# Patient Record
Sex: MALE | Race: WHITE | Employment: OTHER | URBAN - NONMETROPOLITAN AREA
[De-identification: names, ages, dates, MRNs, and addresses within clinical notes are randomized per-mention and may not be internally consistent; named-entity substitution may affect disease eponyms.]

---

## 2017-05-25 LAB
CHOLESTEROL, TOTAL: 207 MG/DL
CHOLESTEROL/HDL RATIO: NORMAL
HDLC SERPL-MCNC: 45 MG/DL (ref 35–70)
LDL CHOLESTEROL CALCULATED: 137 MG/DL (ref 0–160)
TRIGL SERPL-MCNC: 168 MG/DL
VLDLC SERPL CALC-MCNC: NORMAL MG/DL

## 2019-05-29 ENCOUNTER — OFFICE VISIT (OUTPATIENT)
Dept: FAMILY MEDICINE CLINIC | Age: 74
End: 2019-05-29
Payer: COMMERCIAL

## 2019-05-29 VITALS
HEIGHT: 71 IN | TEMPERATURE: 97.3 F | OXYGEN SATURATION: 95 % | WEIGHT: 200 LBS | SYSTOLIC BLOOD PRESSURE: 138 MMHG | HEART RATE: 82 BPM | DIASTOLIC BLOOD PRESSURE: 78 MMHG | BODY MASS INDEX: 28 KG/M2

## 2019-05-29 DIAGNOSIS — R07.89 OTHER CHEST PAIN: Primary | ICD-10-CM

## 2019-05-29 PROCEDURE — 93000 ELECTROCARDIOGRAM COMPLETE: CPT | Performed by: NURSE PRACTITIONER

## 2019-05-29 PROCEDURE — 99213 OFFICE O/P EST LOW 20 MIN: CPT | Performed by: NURSE PRACTITIONER

## 2019-05-29 RX ORDER — ALBUTEROL SULFATE 90 UG/1
AEROSOL, METERED RESPIRATORY (INHALATION)
Refills: 11 | COMMUNITY
Start: 2019-05-04 | End: 2019-10-07 | Stop reason: SDUPTHER

## 2019-05-29 RX ORDER — TERBUTALINE SULFATE 2.5 MG/1
TABLET ORAL
Refills: 5 | COMMUNITY
Start: 2019-05-04 | End: 2020-06-25

## 2019-05-29 RX ORDER — LISINOPRIL 40 MG/1
TABLET ORAL
Refills: 1 | COMMUNITY
Start: 2019-05-04 | End: 2019-07-03

## 2019-05-29 NOTE — PROGRESS NOTES
Subjective:  Chief Complaint   Patient presents with    Pain     chest to back statrted last Thursday thought he pulled something       HPI:  The patient complains of intermittent or constant chest pain located in the anterior chest radiation to the back which started 6 days ago. The chest pain began at rest and is not worse with exertion. Denies pain with inspiration. Pain is descried as 10.5 out of 10. No aggravating or relieving factors. No nausea or diaphoresis. The patient denies dyspnea or palpitations. Does have hx asthma but breathing is stable per his report. No previous episodes of chest discomfort quite like this. No recent illness. No trauma or injury to the chest.  The patient denies malaise, fatigue, nasal congestion, cough and sore throat. No fever or chills. No vomiting or diarrhea. Bowel movements are normal, no black or bloody stools. The patient is a non-smoker. No leg pain or swelling. No recent travel, trauma and surgery or OC use or hormone replacement therapy. The patient has not had Aspirin prior to arrival.  The patient presents for evaluation. No asuncion cardiac history. Current Outpatient Medications:     albuterol sulfate  (90 Base) MCG/ACT inhaler, INHALE 2 PUFFS BY MOUTH EVERY 4 TO 6 HOURS AS NEEDED, Disp: , Rfl: 11    lisinopril (PRINIVIL;ZESTRIL) 40 MG tablet, TAKE ONE TABLET BY MOUTH EVERY DAY, Disp: , Rfl: 1    terbutaline (BRETHINE) 2.5 MG tablet, TAKE ONE TABLET BY MOUTH TWO TIMES A DAY, Disp: , Rfl: 5   Allergies   Allergen Reactions    Hydrochlorothiazide Hives    Amlodipine     Bee Venom     Erythromycin Diarrhea    Penicillins       No past medical history on file.      Objective:  Vitals:    05/29/19 1343   BP: 138/78   Site: Left Upper Arm   Position: Sitting   Cuff Size: Medium Adult   Pulse: 82   Temp: 97.3 °F (36.3 °C)   TempSrc: Temporal   SpO2: 95%   Weight: 200 lb (90.7 kg)   Height: 5' 11\" (1.803 m)        Exam:  Const: Appears healthy and well developed. No signs of acute distress present. Vitals reviewed per triage. Head/Face: Normocephalic, atraumatic. Facies is symmetric. Eyes: PERRL. ENMT: Tympanic membranes are pearly gray with good light reflex bilaterally. Nares are patent. Buccal mucosa is moist.  No erythema in the posterior pharynx. Neck: Supple and symmetric. Palpation reveals no adenopathy. Trachea midline. No JVD. Resp: Lungs are clear bilaterally. No wheezes, rhonchi or crackles noted. Chest expansion is symmetrical no accessory muscle use is noted. CV: S1 is normal. S2 is normal.  No murmurs rubs or clicks noted. No chest wall tenderness. Extremities: Peripheral circulation is grossly normal.  Abdomen: Soft, nontender to palpation. No masses or organomegaly. No pulsatile mass. No rebound or guarding. Musculo: Patient moves extremities without pain or limitation. No pedal edema. Skin: Skin is warm and dry. Neuro: Alert and oriented x3. Speech is articulate and fluent. Psych: Mood/Affect: Patient's mood and affect is appropriate to situation. [facility] was seen today for pain. Diagnoses and all orders for this visit:    Other chest pain  -     EKG 12 Lead      His EKG is unchanged from previous, however, given his history and physical, I do believe further evaluation is warranted in the ER. Report is called. He is hemodynamically stable and refuses EMS, will drive himself.   Seen By:    Alfredia Schaumann, CORY - CNP

## 2019-06-06 LAB
ALBUMIN SERPL-MCNC: NORMAL G/DL
ALP BLD-CCNC: NORMAL U/L
ALT SERPL-CCNC: NORMAL U/L
ANION GAP SERPL CALCULATED.3IONS-SCNC: NORMAL MMOL/L
AST SERPL-CCNC: NORMAL U/L
BILIRUB SERPL-MCNC: NORMAL MG/DL (ref 0.1–1.4)
BUN BLDV-MCNC: NORMAL MG/DL
CALCIUM SERPL-MCNC: NORMAL MG/DL
CHLORIDE BLD-SCNC: NORMAL MMOL/L
CO2: NORMAL MMOL/L
CREAT SERPL-MCNC: NORMAL MG/DL
GFR CALCULATED: NORMAL
GLUCOSE BLD-MCNC: NORMAL MG/DL
HCT VFR BLD CALC: NORMAL % (ref 41–53)
HEMOGLOBIN: NORMAL G/DL (ref 13.5–17.5)
PLATELET # BLD: NORMAL K/ΜL
POTASSIUM SERPL-SCNC: NORMAL MMOL/L
SODIUM BLD-SCNC: NORMAL MMOL/L
TOTAL PROTEIN: NORMAL
WBC # BLD: NORMAL 10^3/ML

## 2019-06-25 LAB
BUN BLDV-MCNC: NORMAL MG/DL
CALCIUM SERPL-MCNC: NORMAL MG/DL
CHLORIDE BLD-SCNC: NORMAL MMOL/L
CO2: NORMAL MMOL/L
CREAT SERPL-MCNC: NORMAL MG/DL
GFR CALCULATED: NORMAL
GLUCOSE BLD-MCNC: NORMAL MG/DL
POTASSIUM SERPL-SCNC: NORMAL MMOL/L
SODIUM BLD-SCNC: NORMAL MMOL/L

## 2019-07-03 ENCOUNTER — OFFICE VISIT (OUTPATIENT)
Dept: FAMILY MEDICINE CLINIC | Age: 74
End: 2019-07-03
Payer: COMMERCIAL

## 2019-07-03 VITALS
WEIGHT: 196 LBS | DIASTOLIC BLOOD PRESSURE: 68 MMHG | BODY MASS INDEX: 27.44 KG/M2 | TEMPERATURE: 97.6 F | OXYGEN SATURATION: 98 % | SYSTOLIC BLOOD PRESSURE: 138 MMHG | HEIGHT: 71 IN | HEART RATE: 72 BPM

## 2019-07-03 DIAGNOSIS — I25.10 ARTERIOSCLEROTIC CORONARY ARTERY DISEASE: ICD-10-CM

## 2019-07-03 DIAGNOSIS — J45.998 OTHER ASTHMA: ICD-10-CM

## 2019-07-03 DIAGNOSIS — I10 ESSENTIAL HYPERTENSION: ICD-10-CM

## 2019-07-03 DIAGNOSIS — E78.49 OTHER HYPERLIPIDEMIA: ICD-10-CM

## 2019-07-03 PROBLEM — E78.5 HYPERLIPIDEMIA: Status: ACTIVE | Noted: 2019-07-03

## 2019-07-03 PROCEDURE — 99214 OFFICE O/P EST MOD 30 MIN: CPT | Performed by: FAMILY MEDICINE

## 2019-07-03 RX ORDER — POTASSIUM CHLORIDE 20 MEQ/1
TABLET, EXTENDED RELEASE ORAL
Refills: 0 | COMMUNITY
Start: 2019-07-01 | End: 2020-06-25

## 2019-07-03 RX ORDER — IPRATROPIUM BROMIDE 17 UG/1
AEROSOL, METERED RESPIRATORY (INHALATION)
Refills: 2 | COMMUNITY
Start: 2019-06-24 | End: 2019-10-07

## 2019-07-03 RX ORDER — METOPROLOL TARTRATE 50 MG/1
TABLET, FILM COATED ORAL
Refills: 1 | COMMUNITY
Start: 2019-06-24 | End: 2019-10-07 | Stop reason: SDUPTHER

## 2019-07-03 RX ORDER — MULTIVIT,CALC,MINS/IRON/FOLIC 9MG-400MCG
TABLET ORAL
Refills: 1 | COMMUNITY
Start: 2019-06-24 | End: 2020-06-25

## 2019-07-03 RX ORDER — ATORVASTATIN CALCIUM 40 MG/1
TABLET, FILM COATED ORAL
Refills: 1 | COMMUNITY
Start: 2019-06-24 | End: 2019-10-07 | Stop reason: SDUPTHER

## 2019-07-03 RX ORDER — FUROSEMIDE 40 MG/1
TABLET ORAL
Refills: 0 | COMMUNITY
Start: 2019-07-01 | End: 2020-06-25

## 2019-07-03 RX ORDER — AMIODARONE HYDROCHLORIDE 200 MG/1
TABLET ORAL
Refills: 0 | COMMUNITY
Start: 2019-06-24 | End: 2019-08-23

## 2019-07-03 RX ORDER — TRAMADOL HYDROCHLORIDE 50 MG/1
50 TABLET ORAL EVERY 6 HOURS PRN
Qty: 40 TABLET | Refills: 1 | Status: SHIPPED | OUTPATIENT
Start: 2019-07-03 | End: 2019-07-17

## 2019-07-03 ASSESSMENT — PATIENT HEALTH QUESTIONNAIRE - PHQ9
SUM OF ALL RESPONSES TO PHQ QUESTIONS 1-9: 0
2. FEELING DOWN, DEPRESSED OR HOPELESS: 0
SUM OF ALL RESPONSES TO PHQ QUESTIONS 1-9: 0
SUM OF ALL RESPONSES TO PHQ9 QUESTIONS 1 & 2: 0
1. LITTLE INTEREST OR PLEASURE IN DOING THINGS: 0

## 2019-07-03 NOTE — PROGRESS NOTES
OFFICE NOTE    7/3/19  Name: Jeremy Goldman  :1945   Sex:male   Age:73 y.o. SUBJECTIVE  Chief Complaint   Patient presents with    Follow-Up from Hospital     bypass [facility]       HPI  Comes in for transition to care. Developed unstable angina. Was transported to [facility], waited around for several days, signed out [place] and decided to go to [facility] where had bypass and AVR        Review of Systems   Constitutional: Negative for appetite change, fever and unexpected weight change. Nausea and anorexia better. Taking Tylenol for pain   HENT: Negative for congestion, ear pain, hearing loss, sinus pain, sore throat and trouble swallowing. Eyes: Negative for photophobia, redness and visual disturbance. Respiratory: Negative for cough, shortness of breath and wheezing. Chest wall pain   Cardiovascular: Negative for chest pain, palpitations and leg swelling. Gastrointestinal: Negative for abdominal pain, blood in stool, constipation, diarrhea and vomiting. Endocrine: Negative for cold intolerance, polydipsia and polyuria. Genitourinary: Positive for difficulty urinating. Negative for genital sores, hematuria and urgency. Seems to be improving. Had catheter for a while   Musculoskeletal: Positive for arthralgias. Negative for back pain and joint swelling. Skin: Positive for wound. Negative for rash. Venous incision  For graft harvesting bruised, no infection seen, remains painful   Allergic/Immunologic: Negative for food allergies. Neurological: Negative for dizziness, tremors, syncope and headaches. Hematological: Negative for adenopathy. Does not bruise/bleed easily. Psychiatric/Behavioral: Negative for agitation, dysphoric mood, hallucinations and sleep disturbance.             Current Outpatient Medications:     amiodarone (CORDARONE) 200 MG tablet, TAKE ONE TABLET BY MOUTH TWO TIMES A DAY FOR 28 DAYS, Disp: , Rfl: 0    atorvastatin (LIPITOR) 40 MG tablet, TAKE 1

## 2019-07-06 ASSESSMENT — ENCOUNTER SYMPTOMS
SORE THROAT: 0
DIARRHEA: 0
ABDOMINAL PAIN: 0
TROUBLE SWALLOWING: 0
PHOTOPHOBIA: 0
COUGH: 0
BLOOD IN STOOL: 0
EYE REDNESS: 0
SHORTNESS OF BREATH: 0
WHEEZING: 0
BACK PAIN: 0
SINUS PAIN: 0
VOMITING: 0
CONSTIPATION: 0

## 2019-08-05 ENCOUNTER — TELEPHONE (OUTPATIENT)
Dept: FAMILY MEDICINE CLINIC | Age: 74
End: 2019-08-05

## 2019-08-05 RX ORDER — FLUCONAZOLE 150 MG/1
150 TABLET ORAL
Qty: 2 TABLET | Refills: 0 | Status: SHIPPED | OUTPATIENT
Start: 2019-08-05 | End: 2019-08-09

## 2019-08-21 ENCOUNTER — TELEPHONE (OUTPATIENT)
Dept: FAMILY MEDICINE CLINIC | Age: 74
End: 2019-08-21

## 2019-08-22 NOTE — TELEPHONE ENCOUNTER
Patient verbalized understanding to doctors response/instructions, stated that after being released from the hospital his taste for food is like eating road kill, states he hates yogurt and isn't going to eat that, but did confirm he would be in during doctors express hours tomorrow. Express hours given to patient as stated on [facility] Express Schedule, E 3-5p on Fri 8/23/19.

## 2019-08-22 NOTE — TELEPHONE ENCOUNTER
Have him come in Friday and I'll take a look at it during Express.  Drink some Kfir (liquid yogurt or get some yogurt with live cultures and start this

## 2019-08-23 ENCOUNTER — OFFICE VISIT (OUTPATIENT)
Dept: FAMILY MEDICINE CLINIC | Age: 74
End: 2019-08-23
Payer: COMMERCIAL

## 2019-08-23 VITALS
HEIGHT: 71 IN | WEIGHT: 206 LBS | OXYGEN SATURATION: 99 % | BODY MASS INDEX: 28.84 KG/M2 | DIASTOLIC BLOOD PRESSURE: 70 MMHG | SYSTOLIC BLOOD PRESSURE: 124 MMHG | TEMPERATURE: 97.9 F | HEART RATE: 70 BPM

## 2019-08-23 DIAGNOSIS — I25.10 ARTERIOSCLEROTIC CORONARY ARTERY DISEASE: ICD-10-CM

## 2019-08-23 DIAGNOSIS — K14.3 TONGUE COATING: Primary | ICD-10-CM

## 2019-08-23 PROCEDURE — 99213 OFFICE O/P EST LOW 20 MIN: CPT | Performed by: FAMILY MEDICINE

## 2019-08-23 RX ORDER — CANDESARTAN 4 MG/1
1 TABLET ORAL DAILY
Refills: 3 | COMMUNITY
Start: 2019-08-02 | End: 2019-10-07 | Stop reason: SDUPTHER

## 2019-08-23 NOTE — PROGRESS NOTES
OFFICE NOTE    19  Name: Chase George  :1945   Sex:male   Age:74 y.o. SUBJECTIVE  Chief Complaint   Patient presents with   Bettey Glass       HPI came in at our request after call in Rx for thrush didn't help. Had unstable angina and ended up with CABG. Had done pretty well but chafing at acitivity restrictions. Was on antibiotic for URI. Ended up with taste disturbances and coated tongue. Mycelex trochs called in for thrush, didn't help        Review of Systems denies chest pain, orthopnea, syncope, feels weak. Chest incision healing nicely. Leg incisions bother him. Current Outpatient Medications:     atorvastatin (LIPITOR) 40 MG tablet, TAKE 1 TABLET BY MOUTH ONCE DAILY. , Disp: , Rfl: 1    furosemide (LASIX) 40 MG tablet, TAKE 1 TABLET BY MOUTH ONCE DAILY FOR 5 DAYS, Disp: , Rfl: 0    ATROVENT HFA 17 MCG/ACT inhaler, INHALE TWO PUFFS BY MOUTH EVERY 6 HOURS AS INSTRUCTED, Disp: , Rfl: 2    metoprolol tartrate (LOPRESSOR) 50 MG tablet, TAKE ONE-HALF (1/2) TABLET BY MOUTH EVERY 12 HOURS, Disp: , Rfl: 1    potassium chloride (KLOR-CON M) 20 MEQ extended release tablet, TAKE ONE TABLET BY MOUTH ONCE DAILY FOR 5 DAYS, Disp: , Rfl: 0    Multiple Vitamins-Minerals (THEREMS-M) TABS, TAKE 1 TABLET BY MOUTH DAILY WITH BREAKFAST., Disp: , Rfl: 1    albuterol sulfate  (90 Base) MCG/ACT inhaler, INHALE 2 PUFFS BY MOUTH EVERY 4 TO 6 HOURS AS NEEDED, Disp: , Rfl: 11    terbutaline (BRETHINE) 2.5 MG tablet, TAKE ONE TABLET BY MOUTH TWO TIMES A DAY, Disp: , Rfl: 5    candesartan (ATACAND) 4 MG tablet, Take 1 tablet by mouth daily, Disp: , Rfl: 3  Allergies   Allergen Reactions    Hydrochlorothiazide Hives    Amlodipine     Bee Venom     Erythromycin Diarrhea    Penicillins        Past Medical History:   Diagnosis Date    Elevated PSA     Hyperlipidemia     Hypertension     Other asthma     Renal calculi     x2 (feels like always after trauma!)     Past Surgical History:   Procedure

## 2019-08-28 ENCOUNTER — TELEPHONE (OUTPATIENT)
Dept: FAMILY MEDICINE CLINIC | Age: 74
End: 2019-08-28

## 2019-08-28 NOTE — TELEPHONE ENCOUNTER
Wife left message on machine stating at visit you mentioned sending patient over a mouth wash to [facility] in [place]. They have not received anything, I do not see anything under meds.  Please advise

## 2019-10-07 ENCOUNTER — OFFICE VISIT (OUTPATIENT)
Dept: FAMILY MEDICINE CLINIC | Age: 74
End: 2019-10-07
Payer: COMMERCIAL

## 2019-10-07 VITALS
SYSTOLIC BLOOD PRESSURE: 110 MMHG | OXYGEN SATURATION: 99 % | BODY MASS INDEX: 27.16 KG/M2 | HEART RATE: 52 BPM | TEMPERATURE: 97.5 F | WEIGHT: 194.7 LBS | DIASTOLIC BLOOD PRESSURE: 66 MMHG

## 2019-10-07 DIAGNOSIS — E78.49 OTHER HYPERLIPIDEMIA: ICD-10-CM

## 2019-10-07 DIAGNOSIS — I10 BENIGN ESSENTIAL HYPERTENSION: Primary | ICD-10-CM

## 2019-10-07 DIAGNOSIS — Z12.5 SCREENING FOR PROSTATE CANCER: ICD-10-CM

## 2019-10-07 DIAGNOSIS — I25.10 ARTERIOSCLEROTIC CORONARY ARTERY DISEASE: ICD-10-CM

## 2019-10-07 DIAGNOSIS — J45.998 OTHER ASTHMA: ICD-10-CM

## 2019-10-07 DIAGNOSIS — N52.9 ERECTILE DYSFUNCTION, UNSPECIFIED ERECTILE DYSFUNCTION TYPE: ICD-10-CM

## 2019-10-07 PROCEDURE — 1123F ACP DISCUSS/DSCN MKR DOCD: CPT | Performed by: FAMILY MEDICINE

## 2019-10-07 PROCEDURE — 4004F PT TOBACCO SCREEN RCVD TLK: CPT | Performed by: FAMILY MEDICINE

## 2019-10-07 PROCEDURE — G8598 ASA/ANTIPLAT THER USED: HCPCS | Performed by: FAMILY MEDICINE

## 2019-10-07 PROCEDURE — 3017F COLORECTAL CA SCREEN DOC REV: CPT | Performed by: FAMILY MEDICINE

## 2019-10-07 PROCEDURE — 96372 THER/PROPH/DIAG INJ SC/IM: CPT | Performed by: FAMILY MEDICINE

## 2019-10-07 PROCEDURE — 4040F PNEUMOC VAC/ADMIN/RCVD: CPT | Performed by: FAMILY MEDICINE

## 2019-10-07 PROCEDURE — G8419 CALC BMI OUT NRM PARAM NOF/U: HCPCS | Performed by: FAMILY MEDICINE

## 2019-10-07 PROCEDURE — 99214 OFFICE O/P EST MOD 30 MIN: CPT | Performed by: FAMILY MEDICINE

## 2019-10-07 PROCEDURE — G8427 DOCREV CUR MEDS BY ELIG CLIN: HCPCS | Performed by: FAMILY MEDICINE

## 2019-10-07 PROCEDURE — G8484 FLU IMMUNIZE NO ADMIN: HCPCS | Performed by: FAMILY MEDICINE

## 2019-10-07 RX ORDER — ALBUTEROL SULFATE 90 UG/1
AEROSOL, METERED RESPIRATORY (INHALATION)
Qty: 1 INHALER | Refills: 11 | Status: SHIPPED | OUTPATIENT
Start: 2019-10-07 | End: 2019-12-10

## 2019-10-07 RX ORDER — POTASSIUM CHLORIDE 750 MG/1
TABLET, FILM COATED, EXTENDED RELEASE ORAL
Refills: 0 | COMMUNITY
Start: 2019-08-20 | End: 2020-06-25

## 2019-10-07 RX ORDER — METOPROLOL TARTRATE 50 MG/1
TABLET, FILM COATED ORAL
Qty: 90 TABLET | Refills: 1 | Status: SHIPPED
Start: 2019-10-07 | End: 2020-06-17

## 2019-10-07 RX ORDER — ATORVASTATIN CALCIUM 40 MG/1
TABLET, FILM COATED ORAL
Qty: 90 TABLET | Refills: 1 | Status: SHIPPED
Start: 2019-10-07 | End: 2022-08-08

## 2019-10-07 RX ORDER — TORSEMIDE 20 MG/1
TABLET ORAL
Refills: 0 | COMMUNITY
Start: 2019-08-20 | End: 2020-06-25

## 2019-10-07 RX ORDER — METHYLPREDNISOLONE ACETATE 40 MG/ML
40 INJECTION, SUSPENSION INTRA-ARTICULAR; INTRALESIONAL; INTRAMUSCULAR; SOFT TISSUE ONCE
Status: COMPLETED | OUTPATIENT
Start: 2019-10-07 | End: 2019-10-07

## 2019-10-07 RX ORDER — SILDENAFIL 50 MG/1
50 TABLET, FILM COATED ORAL DAILY PRN
Qty: 10 TABLET | Refills: 5 | Status: SHIPPED | OUTPATIENT
Start: 2019-10-07 | End: 2019-10-16 | Stop reason: SDUPTHER

## 2019-10-07 RX ORDER — MONTELUKAST SODIUM 10 MG/1
10 TABLET ORAL DAILY
Qty: 30 TABLET | Refills: 3 | Status: SHIPPED
Start: 2019-10-07 | End: 2020-06-25

## 2019-10-07 RX ORDER — CANDESARTAN 4 MG/1
4 TABLET ORAL DAILY
Qty: 90 TABLET | Refills: 1 | Status: SHIPPED | OUTPATIENT
Start: 2019-10-07

## 2019-10-07 RX ADMIN — METHYLPREDNISOLONE ACETATE 40 MG: 40 INJECTION, SUSPENSION INTRA-ARTICULAR; INTRALESIONAL; INTRAMUSCULAR; SOFT TISSUE at 13:10

## 2019-10-07 SDOH — HEALTH STABILITY: MENTAL HEALTH: HOW MANY STANDARD DRINKS CONTAINING ALCOHOL DO YOU HAVE ON A TYPICAL DAY?: 1 OR 2

## 2019-10-07 SDOH — HEALTH STABILITY: MENTAL HEALTH: HOW OFTEN DO YOU HAVE A DRINK CONTAINING ALCOHOL?: MONTHLY OR LESS

## 2019-10-07 ASSESSMENT — ENCOUNTER SYMPTOMS
ABDOMINAL PAIN: 0
TROUBLE SWALLOWING: 0
SINUS PAIN: 0
BLOOD IN STOOL: 0
VOMITING: 0
SHORTNESS OF BREATH: 1
CONSTIPATION: 0
COUGH: 0
SORE THROAT: 0
EYE REDNESS: 0
WHEEZING: 0
BACK PAIN: 0
PHOTOPHOBIA: 0
DIARRHEA: 0

## 2019-10-14 ENCOUNTER — TELEPHONE (OUTPATIENT)
Dept: FAMILY MEDICINE CLINIC | Age: 74
End: 2019-10-14

## 2019-10-16 DIAGNOSIS — N52.9 ERECTILE DYSFUNCTION, UNSPECIFIED ERECTILE DYSFUNCTION TYPE: ICD-10-CM

## 2019-10-16 RX ORDER — SILDENAFIL 50 MG/1
50 TABLET, FILM COATED ORAL DAILY PRN
Qty: 10 TABLET | Refills: 5 | Status: SHIPPED | OUTPATIENT
Start: 2019-10-16 | End: 2020-06-25

## 2019-12-09 ENCOUNTER — TELEPHONE (OUTPATIENT)
Dept: FAMILY MEDICINE CLINIC | Age: 74
End: 2019-12-09

## 2019-12-09 DIAGNOSIS — J45.40 MODERATE PERSISTENT ASTHMA, UNSPECIFIED WHETHER COMPLICATED: Primary | ICD-10-CM

## 2019-12-09 RX ORDER — ALBUTEROL SULFATE 90 UG/1
2 AEROSOL, METERED RESPIRATORY (INHALATION) EVERY 6 HOURS PRN
Qty: 1 INHALER | Refills: 3 | Status: SHIPPED
Start: 2019-12-09 | End: 2020-03-25 | Stop reason: SDUPTHER

## 2020-03-03 ENCOUNTER — OFFICE VISIT (OUTPATIENT)
Dept: FAMILY MEDICINE CLINIC | Age: 75
End: 2020-03-03
Payer: COMMERCIAL

## 2020-03-03 VITALS
OXYGEN SATURATION: 95 % | WEIGHT: 203 LBS | HEART RATE: 81 BPM | SYSTOLIC BLOOD PRESSURE: 140 MMHG | BODY MASS INDEX: 28.42 KG/M2 | TEMPERATURE: 97.9 F | HEIGHT: 71 IN | DIASTOLIC BLOOD PRESSURE: 76 MMHG

## 2020-03-03 PROCEDURE — 99214 OFFICE O/P EST MOD 30 MIN: CPT | Performed by: PHYSICIAN ASSISTANT

## 2020-03-03 PROCEDURE — G8417 CALC BMI ABV UP PARAM F/U: HCPCS | Performed by: PHYSICIAN ASSISTANT

## 2020-03-03 PROCEDURE — 1123F ACP DISCUSS/DSCN MKR DOCD: CPT | Performed by: PHYSICIAN ASSISTANT

## 2020-03-03 PROCEDURE — G8484 FLU IMMUNIZE NO ADMIN: HCPCS | Performed by: PHYSICIAN ASSISTANT

## 2020-03-03 PROCEDURE — G8427 DOCREV CUR MEDS BY ELIG CLIN: HCPCS | Performed by: PHYSICIAN ASSISTANT

## 2020-03-03 PROCEDURE — 4004F PT TOBACCO SCREEN RCVD TLK: CPT | Performed by: PHYSICIAN ASSISTANT

## 2020-03-03 PROCEDURE — 4040F PNEUMOC VAC/ADMIN/RCVD: CPT | Performed by: PHYSICIAN ASSISTANT

## 2020-03-03 PROCEDURE — 94640 AIRWAY INHALATION TREATMENT: CPT | Performed by: PHYSICIAN ASSISTANT

## 2020-03-03 PROCEDURE — 96372 THER/PROPH/DIAG INJ SC/IM: CPT | Performed by: PHYSICIAN ASSISTANT

## 2020-03-03 PROCEDURE — 3017F COLORECTAL CA SCREEN DOC REV: CPT | Performed by: PHYSICIAN ASSISTANT

## 2020-03-03 RX ORDER — BENZONATATE 100 MG/1
100 CAPSULE ORAL 3 TIMES DAILY PRN
Qty: 21 CAPSULE | Refills: 0 | Status: SHIPPED | OUTPATIENT
Start: 2020-03-03 | End: 2020-03-10

## 2020-03-03 RX ORDER — IPRATROPIUM BROMIDE AND ALBUTEROL SULFATE 2.5; .5 MG/3ML; MG/3ML
1 SOLUTION RESPIRATORY (INHALATION) ONCE
Status: COMPLETED | OUTPATIENT
Start: 2020-03-03 | End: 2020-03-03

## 2020-03-03 RX ORDER — DOXYCYCLINE HYCLATE 100 MG
100 TABLET ORAL 2 TIMES DAILY
Qty: 20 TABLET | Refills: 0 | Status: SHIPPED | OUTPATIENT
Start: 2020-03-03 | End: 2020-03-13

## 2020-03-03 RX ORDER — METHYLPREDNISOLONE ACETATE 40 MG/ML
40 INJECTION, SUSPENSION INTRA-ARTICULAR; INTRALESIONAL; INTRAMUSCULAR; SOFT TISSUE ONCE
Status: COMPLETED | OUTPATIENT
Start: 2020-03-03 | End: 2020-03-03

## 2020-03-03 RX ADMIN — IPRATROPIUM BROMIDE AND ALBUTEROL SULFATE 1 AMPULE: 2.5; .5 SOLUTION RESPIRATORY (INHALATION) at 14:39

## 2020-03-03 RX ADMIN — METHYLPREDNISOLONE ACETATE 40 MG: 40 INJECTION, SUSPENSION INTRA-ARTICULAR; INTRALESIONAL; INTRAMUSCULAR; SOFT TISSUE at 14:58

## 2020-03-03 ASSESSMENT — ENCOUNTER SYMPTOMS
COUGH: 1
ABDOMINAL PAIN: 0
DIARRHEA: 0
NAUSEA: 0
BACK PAIN: 0
PHOTOPHOBIA: 0
VOMITING: 0
SHORTNESS OF BREATH: 0
SORE THROAT: 0

## 2020-03-03 NOTE — PROGRESS NOTES
benzonatate (TESSALON) 100 MG capsule, Take 1 capsule by mouth 3 times daily as needed for Cough, Disp: 21 capsule, Rfl: 0    albuterol sulfate HFA (PROAIR HFA) 108 (90 Base) MCG/ACT inhaler, Inhale 2 puffs into the lungs every 6 hours as needed for Wheezing, Disp: 1 Inhaler, Rfl: 3    atorvastatin (LIPITOR) 40 MG tablet, TAKE 1 TABLET BY MOUTH ONCE DAILY. , Disp: 90 tablet, Rfl: 1    metoprolol tartrate (LOPRESSOR) 50 MG tablet, TAKE ONE-HALF (1/2) TABLET BY MOUTH EVERY 12 HOURS, Disp: 90 tablet, Rfl: 1    candesartan (ATACAND) 4 MG tablet, Take 1 tablet by mouth daily, Disp: 90 tablet, Rfl: 1    aspirin 81 MG tablet, Take 2 tablets by mouth daily, Disp: 180 tablet, Rfl: 1    Multiple Vitamins-Minerals (THEREMS-M) TABS, TAKE 1 TABLET BY MOUTH DAILY WITH BREAKFAST., Disp: , Rfl: 1    sildenafil (VIAGRA) 50 MG tablet, Take 1 tablet by mouth daily as needed for Erectile Dysfunction (Patient not taking: Reported on 3/3/2020), Disp: 10 tablet, Rfl: 5    torsemide (DEMADEX) 20 MG tablet, TAKE 1 TABLET BY MOUTH ONCE DAILY FOR 15 DAYS, Disp: , Rfl: 0    potassium chloride (KLOR-CON) 10 MEQ extended release tablet, TAKE 1 TABLET BY MOUTH ONCE DAILY WHILE ON TORSEMIDE, Disp: , Rfl: 0    montelukast (SINGULAIR) 10 MG tablet, Take 1 tablet by mouth daily (Patient not taking: Reported on 3/3/2020), Disp: 30 tablet, Rfl: 3    furosemide (LASIX) 40 MG tablet, TAKE 1 TABLET BY MOUTH ONCE DAILY FOR 5 DAYS, Disp: , Rfl: 0    potassium chloride (KLOR-CON M) 20 MEQ extended release tablet, TAKE ONE TABLET BY MOUTH ONCE DAILY FOR 5 DAYS, Disp: , Rfl: 0    terbutaline (BRETHINE) 2.5 MG tablet, TAKE ONE TABLET BY MOUTH TWO TIMES A DAY, Disp: , Rfl: 5    Allergies:      Allergies   Allergen Reactions    Hydrochlorothiazide Hives    Amlodipine     Bee Venom     Erythromycin Diarrhea    Penicillins        Social History:     Social History     Tobacco Use    Smoking status: Never Smoker    Smokeless tobacco: Current User room air. He does have wheezing on exam.  Patient does not appear dyspneic. He is able to speak in full complete sentences. He will be given a breathing treatment here in the office. I recommended an IM injection of depo-medrol but the patient refuses. Patient states that steroids caused him to \"fill with fluid\". Patient was given a depo-medrol injection last 10/2019 and seemed to tolerate it fine but he continues to refuse all steroids. He does have an inhaler at home that he will continue to use. He will be given a Duoneb treatment here. Patient has better air exchange after the Duoneb. He symptomatically feels better. Repeat pulse ox 96% after the nebulizer. Patient was given a prescription for doxycycline and tessalon perles. Patient does consent to the IM depo  Medrol. He will continue his albuterol. He will follow-up with Dr. Castillo Jain. He will return or go to the emergency department or worsening symptoms. Clinical Impression:   Ulysses Carbine was seen today for cough and headache. Diagnoses and all orders for this visit:    Bronchitis    Other orders  -     ipratropium-albuterol (DUONEB) nebulizer solution 1 ampule  -     doxycycline hyclate (VIBRA-TABS) 100 MG tablet; Take 1 tablet by mouth 2 times daily for 10 days        The patient is to call for any concerns or return if any of the signs or symptoms worsen. The patient is to follow-up with PCP in the next 2-3 days for repeat evaluation repeat assessment or go directly to the emergency department. SIGNATURE: Sujey Mathis PA-C

## 2020-03-25 RX ORDER — ALBUTEROL SULFATE 90 UG/1
2 AEROSOL, METERED RESPIRATORY (INHALATION) EVERY 6 HOURS PRN
Qty: 1 INHALER | Refills: 2 | Status: SHIPPED
Start: 2020-03-25 | End: 2020-06-25 | Stop reason: SDUPTHER

## 2020-03-25 NOTE — TELEPHONE ENCOUNTER
Last Appointment:  10/7/2019  Future Appointments   Date Time Provider Jena Maguire   6/25/2020  3:30 PM Erika Chávez  [address] [place] [address]      April visit moved to June.  Please refill inhaler

## 2020-06-17 RX ORDER — METOPROLOL TARTRATE 50 MG/1
TABLET, FILM COATED ORAL
Qty: 90 TABLET | Refills: 0 | Status: SHIPPED | OUTPATIENT
Start: 2020-06-17

## 2020-06-17 NOTE — TELEPHONE ENCOUNTER
Last Appointment:  10/7/2019  Future Appointments   Date Time Provider Jena Maguire   6/25/2020  3:30 PM Dora Morris  [address] [facility] [address]

## 2020-06-25 ENCOUNTER — OFFICE VISIT (OUTPATIENT)
Dept: FAMILY MEDICINE CLINIC | Age: 75
End: 2020-06-25
Payer: COMMERCIAL

## 2020-06-25 VITALS
OXYGEN SATURATION: 98 % | SYSTOLIC BLOOD PRESSURE: 136 MMHG | DIASTOLIC BLOOD PRESSURE: 74 MMHG | BODY MASS INDEX: 28.7 KG/M2 | WEIGHT: 205.8 LBS | TEMPERATURE: 98.1 F | HEART RATE: 83 BPM

## 2020-06-25 PROCEDURE — 4004F PT TOBACCO SCREEN RCVD TLK: CPT | Performed by: FAMILY MEDICINE

## 2020-06-25 PROCEDURE — 99213 OFFICE O/P EST LOW 20 MIN: CPT | Performed by: FAMILY MEDICINE

## 2020-06-25 PROCEDURE — 1123F ACP DISCUSS/DSCN MKR DOCD: CPT | Performed by: FAMILY MEDICINE

## 2020-06-25 PROCEDURE — G8427 DOCREV CUR MEDS BY ELIG CLIN: HCPCS | Performed by: FAMILY MEDICINE

## 2020-06-25 PROCEDURE — 3017F COLORECTAL CA SCREEN DOC REV: CPT | Performed by: FAMILY MEDICINE

## 2020-06-25 PROCEDURE — G8417 CALC BMI ABV UP PARAM F/U: HCPCS | Performed by: FAMILY MEDICINE

## 2020-06-25 PROCEDURE — 4040F PNEUMOC VAC/ADMIN/RCVD: CPT | Performed by: FAMILY MEDICINE

## 2020-06-25 RX ORDER — DM/P-EPHED/ACETAMINOPH/DOXYLAM
LIQUID (ML) ORAL
COMMUNITY

## 2020-06-25 ASSESSMENT — PATIENT HEALTH QUESTIONNAIRE - PHQ9
SUM OF ALL RESPONSES TO PHQ9 QUESTIONS 1 & 2: 0
1. LITTLE INTEREST OR PLEASURE IN DOING THINGS: 0
SUM OF ALL RESPONSES TO PHQ QUESTIONS 1-9: 0
SUM OF ALL RESPONSES TO PHQ QUESTIONS 1-9: 0
2. FEELING DOWN, DEPRESSED OR HOPELESS: 0

## 2020-06-26 RX ORDER — MONTELUKAST SODIUM 10 MG/1
10 TABLET ORAL NIGHTLY
Qty: 30 TABLET | Refills: 5 | Status: SHIPPED
Start: 2020-06-26 | End: 2022-04-20

## 2020-06-26 ASSESSMENT — ENCOUNTER SYMPTOMS
ABDOMINAL PAIN: 0
BLOOD IN STOOL: 0
SHORTNESS OF BREATH: 1
PHOTOPHOBIA: 0
BACK PAIN: 0
TROUBLE SWALLOWING: 0
WHEEZING: 1
COUGH: 1
SINUS PAIN: 0
CONSTIPATION: 0
VOMITING: 0
EYE REDNESS: 0
DIARRHEA: 0
SORE THROAT: 0

## 2020-12-07 ENCOUNTER — VIRTUAL VISIT (OUTPATIENT)
Dept: FAMILY MEDICINE CLINIC | Age: 75
End: 2020-12-07
Payer: COMMERCIAL

## 2020-12-07 PROCEDURE — 3017F COLORECTAL CA SCREEN DOC REV: CPT | Performed by: FAMILY MEDICINE

## 2020-12-07 PROCEDURE — 1123F ACP DISCUSS/DSCN MKR DOCD: CPT | Performed by: FAMILY MEDICINE

## 2020-12-07 PROCEDURE — G8484 FLU IMMUNIZE NO ADMIN: HCPCS | Performed by: FAMILY MEDICINE

## 2020-12-07 PROCEDURE — 4040F PNEUMOC VAC/ADMIN/RCVD: CPT | Performed by: FAMILY MEDICINE

## 2020-12-07 PROCEDURE — 4004F PT TOBACCO SCREEN RCVD TLK: CPT | Performed by: FAMILY MEDICINE

## 2020-12-07 PROCEDURE — G8427 DOCREV CUR MEDS BY ELIG CLIN: HCPCS | Performed by: FAMILY MEDICINE

## 2020-12-07 PROCEDURE — 99213 OFFICE O/P EST LOW 20 MIN: CPT | Performed by: FAMILY MEDICINE

## 2020-12-07 PROCEDURE — G8417 CALC BMI ABV UP PARAM F/U: HCPCS | Performed by: FAMILY MEDICINE

## 2020-12-07 RX ORDER — MONTELUKAST SODIUM 10 MG/1
10 TABLET ORAL NIGHTLY
Qty: 30 TABLET | Refills: 5 | Status: SHIPPED
Start: 2020-12-07 | End: 2022-04-20

## 2020-12-07 RX ORDER — METHYLPREDNISOLONE 4 MG/1
TABLET ORAL
Qty: 1 KIT | Refills: 0 | Status: SHIPPED | OUTPATIENT
Start: 2020-12-07 | End: 2020-12-13

## 2020-12-07 RX ORDER — ALBUTEROL SULFATE 90 UG/1
2 AEROSOL, METERED RESPIRATORY (INHALATION) EVERY 6 HOURS PRN
Qty: 1 INHALER | Refills: 3 | Status: SHIPPED
Start: 2020-12-07 | End: 2021-10-05 | Stop reason: SDUPTHER

## 2020-12-07 ASSESSMENT — ENCOUNTER SYMPTOMS
COUGH: 1
SORE THROAT: 0
SINUS PAIN: 0
PHOTOPHOBIA: 0
BLOOD IN STOOL: 0
WHEEZING: 1
CHEST TIGHTNESS: 1
ABDOMINAL PAIN: 0
DIARRHEA: 0
SHORTNESS OF BREATH: 1
BACK PAIN: 0
VOMITING: 0
EYE REDNESS: 0
CONSTIPATION: 0
TROUBLE SWALLOWING: 0

## 2020-12-07 ASSESSMENT — PATIENT HEALTH QUESTIONNAIRE - PHQ9
SUM OF ALL RESPONSES TO PHQ9 QUESTIONS 1 & 2: 0
SUM OF ALL RESPONSES TO PHQ QUESTIONS 1-9: 0
2. FEELING DOWN, DEPRESSED OR HOPELESS: 0
SUM OF ALL RESPONSES TO PHQ QUESTIONS 1-9: 0
SUM OF ALL RESPONSES TO PHQ QUESTIONS 1-9: 0
1. LITTLE INTEREST OR PLEASURE IN DOING THINGS: 0

## 2020-12-07 NOTE — PROGRESS NOTES
OFFICE NOTE    20  Name: Ratna Huang  :1945   Sex:male   Age:75 y.o. SUBJECTIVE  Chief Complaint   Patient presents with    Asthma       HPI Doxy me, video visit. C/o cough and ESTEVEZ. Using inhaler more frequently. Feels brandname ProAir more effective than generic. Not taking Singulair. Was evaluated in [facility] and put on several inhalers, didn't get any of these due to cost. Discussed flu shot won't get this either    Review of Systems   Constitutional: Positive for fatigue. Negative for appetite change, fever and unexpected weight change. HENT: Positive for postnasal drip. Negative for congestion, ear pain, hearing loss, sinus pain, sore throat and trouble swallowing. Eyes: Negative for photophobia, redness and visual disturbance. Respiratory: Positive for cough, chest tightness, shortness of breath and wheezing. Cardiovascular: Negative for chest pain, palpitations and leg swelling. Gastrointestinal: Negative for abdominal pain, blood in stool, constipation, diarrhea and vomiting. Endocrine: Negative for cold intolerance, polydipsia and polyuria. Genitourinary: Positive for urgency. Negative for difficulty urinating, genital sores and hematuria. Musculoskeletal: Negative for arthralgias, back pain and joint swelling. Skin: Negative for pallor and rash. Allergic/Immunologic: Positive for environmental allergies. Negative for food allergies. Neurological: Negative for dizziness, tremors, seizures, syncope, numbness and headaches. Hematological: Negative for adenopathy. Does not bruise/bleed easily. Psychiatric/Behavioral: Negative for agitation, dysphoric mood, hallucinations and sleep disturbance. The patient is nervous/anxious. All other systems reviewed and are negative.            Current Outpatient Medications:     methylPREDNISolone (MEDROL DOSEPACK) 4 MG tablet, Take by mouth as directed, Disp: 1 kit, Rfl: 0    montelukast (SINGULAIR) 10 MG tablet, Take 1 tablet by mouth nightly, Disp: 30 tablet, Rfl: 5    albuterol sulfate HFA (PROAIR HFA) 108 (90 Base) MCG/ACT inhaler, Inhale 2 puffs into the lungs every 6 hours as needed for Wheezing, Disp: 1 Inhaler, Rfl: 3    PROAIR  (90 Base) MCG/ACT inhaler, Inhale 2 puffs into the lungs every 6 hours as needed for Wheezing, Disp: 1 Inhaler, Rfl: 2    montelukast (SINGULAIR) 10 MG tablet, Take 1 tablet by mouth nightly, Disp: 30 tablet, Rfl: 5    Oil of Oregano 1500 MG CAPS, Take by mouth, Disp: , Rfl:     metoprolol tartrate (LOPRESSOR) 50 MG tablet, TAKE ONE-HALF (1/2) TABLET BY MOUTH EVERY 12 HOURS., Disp: 90 tablet, Rfl: 0    atorvastatin (LIPITOR) 40 MG tablet, TAKE 1 TABLET BY MOUTH ONCE DAILY. , Disp: 90 tablet, Rfl: 1    candesartan (ATACAND) 4 MG tablet, Take 1 tablet by mouth daily, Disp: 90 tablet, Rfl: 1    aspirin 81 MG tablet, Take 2 tablets by mouth daily, Disp: 180 tablet, Rfl: 1  Allergies   Allergen Reactions    Hydrochlorothiazide Hives    Amlodipine     Bee Venom     Erythromycin Diarrhea    Iodine     Penicillins        Past Medical History:   Diagnosis Date    Elevated PSA     Hyperlipidemia     Hypertension     Other asthma     Renal calculi     x2 (feels like always after trauma!)     Past Surgical History:   Procedure Laterality Date    EYE SURGERY Left     in the 80's, required laceration to globe repair    LITHOTRIPSY       No family history on file. Social History     Tobacco History     Smoking Status  Never Smoker    Smokeless Tobacco Use  Current User Smokeless Tobacco Type  Chew          Alcohol History     Alcohol Use Status  Yes Comment  occasional          Drug Use     Drug Use Status  Never          Sexual Activity     Sexually Active  Not Currently Partners  Female                OBJECTIVE  There were no vitals filed for this visit. There is no height or weight on file to calculate BMI. No orders of the defined types were placed in this encounter.        EXAM Physical Exam  Nursing note reviewed. Constitutional:       Appearance: Normal appearance. He is normal weight. HENT:      Right Ear: External ear normal.      Nose: Nose normal.   Eyes:      General: No scleral icterus. Conjunctiva/sclera: Conjunctivae normal.   Pulmonary:      Effort: Pulmonary effort is normal.   Abdominal:      General: Abdomen is flat. Musculoskeletal:      Right lower leg: No edema. Left lower leg: No edema. Lymphadenopathy:      Cervical: No cervical adenopathy. Skin:     Coloration: Skin is not jaundiced. Findings: No bruising or rash. Neurological:      Mental Status: He is alert and oriented to person, place, and time. Psychiatric:         Mood and Affect: Mood normal.      Very limited due to constraints from virtual visit      Nolan Perez was seen today for asthma. Diagnoses and all orders for this visit:    Arteriosclerotic coronary artery disease  Seen a year ago. Recommend yearly f/u with his cardiologist  Other asthma  -     methylPREDNISolone (MEDROL DOSEPACK) 4 MG tablet; Take by mouth as directed  -     montelukast (SINGULAIR) 10 MG tablet; Take 1 tablet by mouth nightly  -     albuterol sulfate HFA (PROAIR HFA) 108 (90 Base) MCG/ACT inhaler; Inhale 2 puffs into the lungs every 6 hours as needed for Wheezing  This would seem to be minimalist approach. May need to try one of the long acting inhalers if this doesn't work. Flu shot certainly recommended  Essential hypertension  Was good last time. No home BP cuff. Other hyperlipidemia  On Atorvastatin which he says he is taking        Return in about 6 months (around 6/7/2021). Electronically signed by Weston Dailey MD on 12/7/20 at 11:07 AM [facility] Video Visit    This visit was performed as a virtual video visit using a synchronous, two-way, audio-video telehealth technology platform.  Patient identification was verified at the start of the visit, including the patient's telephone number and physical location. I discussed with the patient the nature of our telehealth visits, that:     1. Due to the nature of an audio- video modality, the only components of a physical exam that could be done are the elements supported by direct observation. 2. I would evaluate the patient and recommend diagnostics and treatments based on my assessment. 3. If it was felt that the patient should be evaluated in clinic or an emergency room setting, then they would be directed there. 4. Our sessions are not being recorded and that personal health information is protected. 5. Our team would provide follow up care in person if/when the patient needs it. Patient does agree to proceed with telemedicine consultation. Patient's location: home address in [facility]  Physician  location other address in [facility] other people involved in call      Time spent: Greater than 20    This visit was completed virtually using Doxy. me

## 2020-12-18 ENCOUNTER — TELEPHONE (OUTPATIENT)
Dept: FAMILY MEDICINE CLINIC | Age: 75
End: 2020-12-18

## 2020-12-18 RX ORDER — CIPROFLOXACIN 500 MG/1
500 TABLET, FILM COATED ORAL 2 TIMES DAILY
Qty: 14 TABLET | Refills: 0 | Status: SHIPPED | OUTPATIENT
Start: 2020-12-18 | End: 2020-12-25

## 2020-12-18 RX ORDER — METRONIDAZOLE 500 MG/1
500 TABLET ORAL 2 TIMES DAILY
Qty: 14 TABLET | Refills: 0 | Status: SHIPPED | OUTPATIENT
Start: 2020-12-18 | End: 2020-12-25

## 2020-12-18 NOTE — TELEPHONE ENCOUNTER
Patients wife calling to let you know that patient is having a flare up of his diverticulitis and they were hoping something could be called in to take care of it before the holiday. They said the last time an ATB was ordered and it worked very well.  Please advise

## 2020-12-18 NOTE — TELEPHONE ENCOUNTER
When patients are older and complicated and get sick like this it is very difficult to manage their problems without face to face contact.  Because it is Friday I will call in standard medication for diverticulitis and hope for the best. In the future he should be brought into express care and evaluated properly

## 2020-12-31 ENCOUNTER — OFFICE VISIT (OUTPATIENT)
Dept: FAMILY MEDICINE CLINIC | Age: 75
End: 2020-12-31
Payer: COMMERCIAL

## 2020-12-31 VITALS
DIASTOLIC BLOOD PRESSURE: 88 MMHG | OXYGEN SATURATION: 97 % | SYSTOLIC BLOOD PRESSURE: 130 MMHG | BODY MASS INDEX: 28.59 KG/M2 | TEMPERATURE: 99 F | WEIGHT: 205 LBS | HEART RATE: 71 BPM

## 2020-12-31 DIAGNOSIS — Z48.02 VISIT FOR SUTURE REMOVAL: Primary | ICD-10-CM

## 2020-12-31 PROCEDURE — G8427 DOCREV CUR MEDS BY ELIG CLIN: HCPCS | Performed by: PHYSICIAN ASSISTANT

## 2020-12-31 PROCEDURE — 4040F PNEUMOC VAC/ADMIN/RCVD: CPT | Performed by: PHYSICIAN ASSISTANT

## 2020-12-31 PROCEDURE — 1123F ACP DISCUSS/DSCN MKR DOCD: CPT | Performed by: PHYSICIAN ASSISTANT

## 2020-12-31 PROCEDURE — G8417 CALC BMI ABV UP PARAM F/U: HCPCS | Performed by: PHYSICIAN ASSISTANT

## 2020-12-31 PROCEDURE — G8484 FLU IMMUNIZE NO ADMIN: HCPCS | Performed by: PHYSICIAN ASSISTANT

## 2020-12-31 PROCEDURE — 3017F COLORECTAL CA SCREEN DOC REV: CPT | Performed by: PHYSICIAN ASSISTANT

## 2020-12-31 PROCEDURE — 99212 OFFICE O/P EST SF 10 MIN: CPT | Performed by: PHYSICIAN ASSISTANT

## 2020-12-31 PROCEDURE — 4004F PT TOBACCO SCREEN RCVD TLK: CPT | Performed by: PHYSICIAN ASSISTANT

## 2021-01-02 NOTE — PROGRESS NOTES
Subjective:  Chief Complaint   Patient presents with    Suture / Staple Removal       HPI: The patient presents for staple removal. The patient sustained a laceration while at home hitting his head on piece of metal.  Staples have been in place for 6 days. Laceration was repaired in ER. The patient reports no complications. Patient denies numbness tingling weakness or paralysis. No ha vision or hearing changes. No redness and warmth to the affected area. Denies purulent drainage from wound. No fever or chills. ROS:Unless otherwise stated in this report the patient's positive and negative responses for review of systems for constitutional, eyes, ENT, cardiovascular, respiratory, gastrointestinal, neurological, , musculoskeletal, and integument systems and related systems to the presenting problem are either stated in the history of present illness or were not pertinent or were negative for the symptoms and/or complaints related to the presenting medical problem. Positives and pertinent negatives as per HPI. All others reviewed and are negative. Current Outpatient Medications:     montelukast (SINGULAIR) 10 MG tablet, Take 1 tablet by mouth nightly, Disp: 30 tablet, Rfl: 5    albuterol sulfate HFA (PROAIR HFA) 108 (90 Base) MCG/ACT inhaler, Inhale 2 puffs into the lungs every 6 hours as needed for Wheezing, Disp: 1 Inhaler, Rfl: 3    PROAIR  (90 Base) MCG/ACT inhaler, Inhale 2 puffs into the lungs every 6 hours as needed for Wheezing, Disp: 1 Inhaler, Rfl: 2    montelukast (SINGULAIR) 10 MG tablet, Take 1 tablet by mouth nightly, Disp: 30 tablet, Rfl: 5    Oil of Oregano 1500 MG CAPS, Take by mouth, Disp: , Rfl:     metoprolol tartrate (LOPRESSOR) 50 MG tablet, TAKE ONE-HALF (1/2) TABLET BY MOUTH EVERY 12 HOURS., Disp: 90 tablet, Rfl: 0    atorvastatin (LIPITOR) 40 MG tablet, TAKE 1 TABLET BY MOUTH ONCE DAILY. , Disp: 90 tablet, Rfl: 1   candesartan (ATACAND) 4 MG tablet, Take 1 tablet by mouth daily, Disp: 90 tablet, Rfl: 1    aspirin 81 MG tablet, Take 2 tablets by mouth daily, Disp: 180 tablet, Rfl: 1   Allergies   Allergen Reactions    Hydrochlorothiazide Hives    Amlodipine     Bee Venom     Erythromycin Diarrhea    Iodine     Penicillins         Objective:  Vitals:    12/31/20 1116   BP: 130/88   Pulse: 71   Temp: 99 °F (37.2 °C)   TempSrc: Temporal   SpO2: 97%   Weight: 205 lb (93 kg)        Exam:  Const: Appears healthy and well developed. No signs of acute distress present. Head/Face: Head is normocephalic, atraumatic. Facies is symmetric. ENMT: Buccal mucosa moist.  Neck: Trachea midline. Resp: No respiratory distress. Musculo: Pulses are equal bilaterally. Good capillary refill. Skin: Dry and warm. Staples intact over scalp. No ecchymosis, abrasions, warmth, erythema or drainage are noted. Neuro: Alert and oriented x3. Speech is intact. Neurovascular intact. Good sensation to soft touch and pinprick is noted. Psych: Mood and affect are appropriate to situation. Wound cleansed and 2 staples removed patient tolerated well antibiotic dressing applied.   Assessment and Plan:  Maryjo Cardoza was seen today for suture / staple removal.    Diagnoses and all orders for this visit:    Visit for suture removal        Seen By:    KEN Dominguez

## 2021-04-28 DIAGNOSIS — J45.40 MODERATE PERSISTENT ASTHMA, UNSPECIFIED WHETHER COMPLICATED: ICD-10-CM

## 2021-10-05 DIAGNOSIS — J45.998 OTHER ASTHMA: ICD-10-CM

## 2021-10-06 RX ORDER — ALBUTEROL SULFATE 90 UG/1
2 AEROSOL, METERED RESPIRATORY (INHALATION) EVERY 6 HOURS PRN
Qty: 1 EACH | Refills: 2 | Status: SHIPPED | OUTPATIENT
Start: 2021-10-06

## 2022-04-20 ENCOUNTER — OFFICE VISIT (OUTPATIENT)
Dept: FAMILY MEDICINE CLINIC | Age: 77
End: 2022-04-20
Payer: MEDICARE

## 2022-04-20 VITALS
TEMPERATURE: 97.3 F | SYSTOLIC BLOOD PRESSURE: 130 MMHG | DIASTOLIC BLOOD PRESSURE: 80 MMHG | HEIGHT: 71 IN | BODY MASS INDEX: 29.4 KG/M2 | WEIGHT: 210 LBS | RESPIRATION RATE: 19 BRPM | OXYGEN SATURATION: 98 % | HEART RATE: 78 BPM

## 2022-04-20 DIAGNOSIS — Z12.5 SCREENING FOR PROSTATE CANCER: Primary | ICD-10-CM

## 2022-04-20 DIAGNOSIS — J45.40 MODERATE PERSISTENT ASTHMA, UNSPECIFIED WHETHER COMPLICATED: ICD-10-CM

## 2022-04-20 DIAGNOSIS — E78.49 OTHER HYPERLIPIDEMIA: ICD-10-CM

## 2022-04-20 PROCEDURE — 99214 OFFICE O/P EST MOD 30 MIN: CPT | Performed by: FAMILY MEDICINE

## 2022-04-20 RX ORDER — POTASSIUM CHLORIDE 1.5 G/1.77G
20 POWDER, FOR SOLUTION ORAL DAILY
COMMUNITY

## 2022-04-20 RX ORDER — TORSEMIDE 20 MG/1
20 TABLET ORAL DAILY
COMMUNITY

## 2022-04-20 SDOH — ECONOMIC STABILITY: FOOD INSECURITY: WITHIN THE PAST 12 MONTHS, THE FOOD YOU BOUGHT JUST DIDN'T LAST AND YOU DIDN'T HAVE MONEY TO GET MORE.: NEVER TRUE

## 2022-04-20 SDOH — ECONOMIC STABILITY: FOOD INSECURITY: WITHIN THE PAST 12 MONTHS, YOU WORRIED THAT YOUR FOOD WOULD RUN OUT BEFORE YOU GOT MONEY TO BUY MORE.: NEVER TRUE

## 2022-04-20 ASSESSMENT — PATIENT HEALTH QUESTIONNAIRE - PHQ9
SUM OF ALL RESPONSES TO PHQ QUESTIONS 1-9: 0
2. FEELING DOWN, DEPRESSED OR HOPELESS: 0
SUM OF ALL RESPONSES TO PHQ QUESTIONS 1-9: 0
SUM OF ALL RESPONSES TO PHQ9 QUESTIONS 1 & 2: 0
1. LITTLE INTEREST OR PLEASURE IN DOING THINGS: 0
SUM OF ALL RESPONSES TO PHQ QUESTIONS 1-9: 0
SUM OF ALL RESPONSES TO PHQ QUESTIONS 1-9: 0

## 2022-04-20 ASSESSMENT — SOCIAL DETERMINANTS OF HEALTH (SDOH): HOW HARD IS IT FOR YOU TO PAY FOR THE VERY BASICS LIKE FOOD, HOUSING, MEDICAL CARE, AND HEATING?: NOT HARD AT ALL

## 2022-04-20 NOTE — PROGRESS NOTES
OFFICE NOTE    22  Name: Kesha Watkins  :1945   Sex:male   Age:76 y.o. SUBJECTIVE  Chief Complaint   Patient presents with    Hypoglycemia    Discuss Medications     having a reaction to water pill        HPI     Review of Systems         Current Outpatient Medications:     torsemide (DEMADEX) 20 MG tablet, Take 20 mg by mouth daily Take a 1/2 a pill a day, Disp: , Rfl:     potassium chloride (KLOR-CON) 20 MEQ packet, Take 20 mEq by mouth daily, Disp: , Rfl:     Magnesium Chloride (MAG64 PO), Take by mouth, Disp: , Rfl:     Fluticasone-Salmeterol (ADVAIR HFA IN), Inhale into the lungs, Disp: , Rfl:     PROAIR  (90 Base) MCG/ACT inhaler, Inhale 2 puffs into the lungs every 6 hours as needed for Wheezing, Disp: 1 each, Rfl: 5    albuterol sulfate HFA (PROAIR HFA) 108 (90 Base) MCG/ACT inhaler, Inhale 2 puffs into the lungs every 6 hours as needed for Wheezing, Disp: 1 each, Rfl: 2    metoprolol tartrate (LOPRESSOR) 50 MG tablet, TAKE ONE-HALF (1/2) TABLET BY MOUTH EVERY 12 HOURS., Disp: 90 tablet, Rfl: 0    atorvastatin (LIPITOR) 40 MG tablet, TAKE 1 TABLET BY MOUTH ONCE DAILY. , Disp: 90 tablet, Rfl: 1    candesartan (ATACAND) 4 MG tablet, Take 1 tablet by mouth daily, Disp: 90 tablet, Rfl: 1    aspirin 81 MG tablet, Take 2 tablets by mouth daily, Disp: 180 tablet, Rfl: 1    Oil of Oregano 1500 MG CAPS, Take by mouth (Patient not taking: Reported on 2022), Disp: , Rfl:   Allergies   Allergen Reactions    Hydrochlorothiazide Hives    Amlodipine     Bee Venom     Erythromycin Diarrhea    Iodine     Penicillins        Past Medical History:   Diagnosis Date    Elevated PSA     Hyperlipidemia     Hypertension     Other asthma     Renal calculi     x2 (feels like always after trauma!)     Past Surgical History:   Procedure Laterality Date    EYE SURGERY Left     in the 80's, required laceration to globe repair    LITHOTRIPSY       No family history on file.   Social History     Tobacco History     Smoking Status  Never Smoker    Smokeless Tobacco Use  Current User Smokeless Tobacco Type  Chew          Alcohol History     Alcohol Use Status  Yes Comment  occasional          Drug Use     Drug Use Status  Never          Sexual Activity     Sexually Active  Not Currently Partners  Female                OBJECTIVE  Vitals:    04/20/22 1539 04/20/22 1559   BP: (!) 180/100 130/80   Pulse: 78    Resp: 19    Temp: 97.3 °F (36.3 °C)    TempSrc: Temporal    SpO2: 98%    Weight: 210 lb (95.3 kg)    Height: 5' 11\" (1.803 m)         Body mass index is 29.29 kg/m². Orders Placed This Encounter   Procedures    CBC with Auto Differential     Standing Status:   Future     Standing Expiration Date:   4/20/2023    Lipid Panel     Standing Status:   Future     Standing Expiration Date:   4/20/2023     Order Specific Question:   Is Patient Fasting?/# of Hours     Answer:   fasting    TSH     Standing Status:   Future     Standing Expiration Date:   4/20/2023    PSA Screening     Standing Status:   Future     Standing Expiration Date:   4/20/2023    Urinalysis     Standing Status:   Future     Standing Expiration Date:   4/20/2023    Hepatic Function Panel     Standing Status:   Future     Standing Expiration Date:   4/20/2023        EXAM   Physical Exam      Asher Breaux was seen today for hypoglycemia and discuss medications. Diagnoses and all orders for this visit:    Screening for prostate cancer  -     PSA Screening; Future  -     Urinalysis; Future    Moderate persistent asthma, unspecified whether complicated  -     PROAIR  (90 Base) MCG/ACT inhaler; Inhale 2 puffs into the lungs every 6 hours as needed for Wheezing  -     CBC with Auto Differential; Future    Other hyperlipidemia  -     Lipid Panel; Future  -     TSH; Future  -     Hepatic Function Panel; Future          No follow-ups on file.     Electronically signed by Carmelo Alejandra MD on 4/20/22 at 4:19 PM EDT

## 2022-08-04 ENCOUNTER — TELEPHONE (OUTPATIENT)
Dept: FAMILY MEDICINE CLINIC | Age: 77
End: 2022-08-04

## 2022-08-04 DIAGNOSIS — J45.41 MODERATE PERSISTENT ASTHMA WITH ACUTE EXACERBATION: Primary | ICD-10-CM

## 2022-08-04 RX ORDER — IPRATROPIUM BROMIDE AND ALBUTEROL SULFATE 2.5; .5 MG/3ML; MG/3ML
SOLUTION RESPIRATORY (INHALATION)
Qty: 360 ML | Refills: 3 | Status: SHIPPED | OUTPATIENT
Start: 2022-08-04

## 2022-08-04 NOTE — TELEPHONE ENCOUNTER
Called and spoke with patient informing patient that we sent over the machine and medication to [facility] and also informed patient to come into the office on Monday through [facility] to come and see Dr. Mahlon Shone. Patient was informed and gave a verbal understanding.      Orders were faxed over to [facility].

## 2022-08-04 NOTE — TELEPHONE ENCOUNTER
Frank Trinh calling about his increased trouble breathing over the past 2 - 3 weeks  . She took him to the ER recently because he was struggling more than usual.    They did not do much for him, but suggested getting a nebulizer. She is agreeable to this if you would send an order to [facility], and the medication to [facility]. Or, or they are open to any suggestion.

## 2022-08-08 ENCOUNTER — OFFICE VISIT (OUTPATIENT)
Dept: FAMILY MEDICINE CLINIC | Age: 77
End: 2022-08-08
Payer: MEDICARE

## 2022-08-08 VITALS
BODY MASS INDEX: 30.1 KG/M2 | SYSTOLIC BLOOD PRESSURE: 126 MMHG | OXYGEN SATURATION: 95 % | DIASTOLIC BLOOD PRESSURE: 84 MMHG | TEMPERATURE: 97.6 F | HEIGHT: 71 IN | RESPIRATION RATE: 17 BRPM | WEIGHT: 215 LBS | HEART RATE: 83 BPM

## 2022-08-08 DIAGNOSIS — J45.41 MODERATE PERSISTENT ASTHMA WITH ACUTE EXACERBATION: Primary | ICD-10-CM

## 2022-08-08 PROCEDURE — 1123F ACP DISCUSS/DSCN MKR DOCD: CPT | Performed by: FAMILY MEDICINE

## 2022-08-08 PROCEDURE — 99213 OFFICE O/P EST LOW 20 MIN: CPT | Performed by: FAMILY MEDICINE

## 2022-08-08 RX ORDER — POTASSIUM BICARBONATE 25 MEQ/1
TABLET, EFFERVESCENT ORAL
Qty: 30 TABLET | Refills: 3 | Status: SHIPPED | OUTPATIENT
Start: 2022-08-08

## 2022-08-08 RX ORDER — MONTELUKAST SODIUM 10 MG/1
10 TABLET, FILM COATED ORAL NIGHTLY
Qty: 30 TABLET | Refills: 3
Start: 2022-08-08 | End: 2022-08-11 | Stop reason: SDUPTHER

## 2022-08-08 RX ORDER — ROSUVASTATIN CALCIUM 20 MG/1
20 TABLET, COATED ORAL DAILY
COMMUNITY

## 2022-08-08 NOTE — PROGRESS NOTES
OFFICE NOTE    22  Name: Deisy Clarke  :1945   Sex:male   Age:77 y.o. SUBJECTIVE  Chief Complaint   Patient presents with    Breathing Problem    Follow-up     [facility]       HPI Was up in [facility] seeing [place] pulmonologist and apparently left after waiting several hours to be seen    Review of Systems   Breathing tight on and off. Believes when he is outside mowing he is worse. Got nebulizer but has only be using once a day. Current Outpatient Medications:     rosuvastatin (CRESTOR) 20 MG tablet, Take 20 mg by mouth in the morning., Disp: , Rfl:     potassium bicarbonate (EFFER-K) 25 MEQ disintegrating tablet, Mix 1 tab with orange juice and take daily. Replaces other potassium, Disp: 30 tablet, Rfl: 3    SINGULAIR 10 MG tablet, Take 1 tablet by mouth nightly, Disp: 30 tablet, Rfl: 3    ipratropium-albuterol (DUONEB) 0.5-2.5 (3) MG/3ML SOLN nebulizer solution, Use up to qid via minineb machine. May use less frequently when able, Disp: 360 mL, Rfl: 3    Nebulizers (COMPRESSOR/NEBULIZER) MISC, 1 unit with necessary accessories, like tubing and mask.  For use in administering duoneb solution as ordered, Disp: 1 each, Rfl: 0    torsemide (DEMADEX) 20 MG tablet, Take 20 mg by mouth daily Take a 1/2 a pill a day, Disp: , Rfl:     potassium chloride (KLOR-CON) 20 MEQ packet, Take 20 mEq by mouth daily, Disp: , Rfl:     Magnesium Chloride (MAG64 PO), Take by mouth, Disp: , Rfl:     PROAIR  (90 Base) MCG/ACT inhaler, Inhale 2 puffs into the lungs every 6 hours as needed for Wheezing, Disp: 1 each, Rfl: 5    albuterol sulfate HFA (PROAIR HFA) 108 (90 Base) MCG/ACT inhaler, Inhale 2 puffs into the lungs every 6 hours as needed for Wheezing, Disp: 1 each, Rfl: 2    metoprolol tartrate (LOPRESSOR) 50 MG tablet, TAKE ONE-HALF (1/2) TABLET BY MOUTH EVERY 12 HOURS., Disp: 90 tablet, Rfl: 0    candesartan (ATACAND) 4 MG tablet, Take 1 tablet by mouth daily, Disp: 90 tablet, Rfl: 1    aspirin 81 MG tablet, Take 2 tablets by mouth daily, Disp: 180 tablet, Rfl: 1    Fluticasone-Salmeterol (ADVAIR HFA IN), Inhale into the lungs (Patient not taking: Reported on 8/8/2022), Disp: , Rfl:     Oil of Oregano 1500 MG CAPS, Take by mouth (Patient not taking: No sig reported), Disp: , Rfl:   Allergies   Allergen Reactions    Hydrochlorothiazide Hives    Advair Hfa [Fluticasone-Salmeterol] Swelling    Amlodipine     Bee Venom     Erythromycin Diarrhea    Iodine     Penicillins        Past Medical History:   Diagnosis Date    Elevated PSA     Hyperlipidemia     Hypertension     Other asthma     Renal calculi     x2 (feels like always after trauma!)     Past Surgical History:   Procedure Laterality Date    EYE SURGERY Left     in the 80's, required laceration to globe repair    LITHOTRIPSY       No family history on file. Social History       Tobacco History       Smoking Status  Never      Smokeless Tobacco Use  Current Smokeless Tobacco Type  Chew              Alcohol History       Alcohol Use Status  Yes Comment  occasional              Drug Use       Drug Use Status  Never              Sexual Activity       Sexually Active  Not Currently Partners  Female                    OBJECTIVE  Vitals:    08/08/22 1538   BP: 126/84   Pulse: 83   Resp: 17   Temp: 97.6 °F (36.4 °C)   TempSrc: Temporal   SpO2: 95%   Weight: 215 lb (97.5 kg)   Height: 5' 11\" (1.803 m)        Body mass index is 29.99 kg/m². No orders of the defined types were placed in this encounter. EXAM   Physical Exam  Vitals and nursing note reviewed. Constitutional:       Appearance: Normal appearance. Comments: Able to speak in complete sentences   HENT:      Right Ear: Tympanic membrane and external ear normal.      Left Ear: Tympanic membrane and external ear normal.      Nose: Congestion present. Mouth/Throat:      Pharynx: Oropharynx is clear. Posterior oropharyngeal erythema present.    Eyes:      Conjunctiva/sclera: Conjunctivae normal. Pupils: Pupils are equal, round, and reactive to light. Cardiovascular:      Rate and Rhythm: Normal rate and regular rhythm. Heart sounds: No murmur heard. Pulmonary:      Effort: Pulmonary effort is normal.      Breath sounds: Wheezing present. No rales. Abdominal:      General: Bowel sounds are normal.      Tenderness: There is no abdominal tenderness. Musculoskeletal:      Cervical back: No tenderness. Lymphadenopathy:      Cervical: No cervical adenopathy. Skin:     Coloration: Skin is not jaundiced. Findings: No bruising or rash. Neurological:      General: No focal deficit present. Mental Status: He is alert and oriented to person, place, and time. Psychiatric:         Mood and Affect: Mood normal.         Behavior: Behavior normal.         Juliana Oliver was seen today for breathing problem and follow-up. Diagnoses and all orders for this visit:    Moderate persistent asthma with acute exacerbation    Other orders  -     potassium bicarbonate (EFFER-K) 25 MEQ disintegrating tablet; Mix 1 tab with orange juice and take daily. Replaces other potassium  -     SINGULAIR 10 MG tablet; Take 1 tablet by mouth nightly  Reports upset stomach believe it is the potassium horse pill he is taking will try different form with OJ. Add singulair and use his inhaler 2-3 time a day with last dose at hs. Says didn't want steroids, they make him whacky      No follow-ups on file.     Electronically signed by Geovanna Hess MD on 8/8/22 at 4:19 PM EDT

## 2022-08-11 ENCOUNTER — TELEPHONE (OUTPATIENT)
Dept: FAMILY MEDICINE CLINIC | Age: 77
End: 2022-08-11

## 2022-08-11 RX ORDER — MONTELUKAST SODIUM 10 MG/1
10 TABLET, FILM COATED ORAL NIGHTLY
Qty: 30 TABLET | Refills: 5 | Status: SHIPPED | OUTPATIENT
Start: 2022-08-11

## 2022-08-11 NOTE — TELEPHONE ENCOUNTER
Pharmacy sent over a rx change request. Wanting to know if they can change rx to generic? Insurance will not cover brand name. Please advise.

## 2023-06-06 LAB
BASOPHILS ABSOLUTE: 0.07 /ΜL
BASOPHILS RELATIVE PERCENT: 1.1 %
CREAT SERPL-MCNC: 0.95 MG/DL
EOSINOPHILS ABSOLUTE: 0.15 /ΜL
EOSINOPHILS RELATIVE PERCENT: 2.3 %
GLUCOSE BLD-MCNC: 102 MG/DL
HCT VFR BLD CALC: 46.1 % (ref 41–53)
HEMOGLOBIN: 14.7 G/DL (ref 13.5–17.5)
LYMPHOCYTES ABSOLUTE: 1.36 /ΜL
LYMPHOCYTES RELATIVE PERCENT: 21 %
MCH RBC QN AUTO: 29 PG
MCHC RBC AUTO-ENTMCNC: 31.9 G/DL
MCV RBC AUTO: 90.9 FL
MONOCYTES ABSOLUTE: 0.88 /ΜL
MONOCYTES RELATIVE PERCENT: 13.6 %
NEUTROPHILS ABSOLUTE: 3.96 /ΜL
NEUTROPHILS RELATIVE PERCENT: 61.1 %
PLATELET # BLD: 154 K/ΜL
PMV BLD AUTO: 11.3 FL
RBC # BLD: 5.07 10^6/ΜL
SODIUM BLD-SCNC: 141 MMOL/L
WBC # BLD: 6.48 10^3/ML

## 2023-08-02 DIAGNOSIS — J45.40 MODERATE PERSISTENT ASTHMA, UNSPECIFIED WHETHER COMPLICATED: ICD-10-CM

## 2023-08-02 NOTE — TELEPHONE ENCOUNTER
Joshua Payton - Wife       Kristin Lagunas needs a new script for [employer] sent to [facility].       Last Appointment:  8/8/2022  Future Appointments   Date Time Provider [address] [place] [address]   8/31/2023  3:00 PM Darcy Ferraro MD [facility]

## 2023-08-31 ENCOUNTER — OFFICE VISIT (OUTPATIENT)
Dept: FAMILY MEDICINE CLINIC | Age: 78
End: 2023-08-31
Payer: MEDICARE

## 2023-08-31 VITALS
WEIGHT: 218.6 LBS | SYSTOLIC BLOOD PRESSURE: 136 MMHG | HEART RATE: 70 BPM | RESPIRATION RATE: 17 BRPM | HEIGHT: 71 IN | TEMPERATURE: 97.5 F | BODY MASS INDEX: 30.6 KG/M2 | DIASTOLIC BLOOD PRESSURE: 82 MMHG | OXYGEN SATURATION: 98 %

## 2023-08-31 DIAGNOSIS — J45.41 MODERATE PERSISTENT ASTHMA WITH ACUTE EXACERBATION: ICD-10-CM

## 2023-08-31 DIAGNOSIS — I10 PRIMARY HYPERTENSION: Primary | ICD-10-CM

## 2023-08-31 DIAGNOSIS — R13.14 PHARYNGOESOPHAGEAL DYSPHAGIA: ICD-10-CM

## 2023-08-31 PROCEDURE — 99214 OFFICE O/P EST MOD 30 MIN: CPT | Performed by: FAMILY MEDICINE

## 2023-08-31 PROCEDURE — 3079F DIAST BP 80-89 MM HG: CPT | Performed by: FAMILY MEDICINE

## 2023-08-31 PROCEDURE — 3075F SYST BP GE 130 - 139MM HG: CPT | Performed by: FAMILY MEDICINE

## 2023-08-31 PROCEDURE — 93000 ELECTROCARDIOGRAM COMPLETE: CPT | Performed by: FAMILY MEDICINE

## 2023-08-31 PROCEDURE — 1123F ACP DISCUSS/DSCN MKR DOCD: CPT | Performed by: FAMILY MEDICINE

## 2023-08-31 RX ORDER — ARFORMOTEROL TARTRATE 15 UG/2ML
1 SOLUTION RESPIRATORY (INHALATION) 2 TIMES DAILY
COMMUNITY

## 2023-08-31 RX ORDER — ARFORMOTEROL TARTRATE 15 UG/2ML
1 SOLUTION RESPIRATORY (INHALATION) 2 TIMES DAILY
Qty: 120 ML | Refills: 3 | Status: SHIPPED | OUTPATIENT
Start: 2023-08-31

## 2023-08-31 RX ORDER — IPRATROPIUM BROMIDE AND ALBUTEROL SULFATE 2.5; .5 MG/3ML; MG/3ML
SOLUTION RESPIRATORY (INHALATION)
Qty: 360 ML | Refills: 3 | Status: SHIPPED | OUTPATIENT
Start: 2023-08-31

## 2023-08-31 SDOH — ECONOMIC STABILITY: INCOME INSECURITY: HOW HARD IS IT FOR YOU TO PAY FOR THE VERY BASICS LIKE FOOD, HOUSING, MEDICAL CARE, AND HEATING?: NOT HARD AT ALL

## 2023-08-31 SDOH — ECONOMIC STABILITY: FOOD INSECURITY: WITHIN THE PAST 12 MONTHS, THE FOOD YOU BOUGHT JUST DIDN'T LAST AND YOU DIDN'T HAVE MONEY TO GET MORE.: NEVER TRUE

## 2023-08-31 SDOH — ECONOMIC STABILITY: HOUSING INSECURITY
IN THE LAST 12 MONTHS, WAS THERE A TIME WHEN YOU DID NOT HAVE A STEADY PLACE TO SLEEP OR SLEPT IN A SHELTER (INCLUDING NOW)?: NO

## 2023-08-31 SDOH — ECONOMIC STABILITY: FOOD INSECURITY: WITHIN THE PAST 12 MONTHS, YOU WORRIED THAT YOUR FOOD WOULD RUN OUT BEFORE YOU GOT MONEY TO BUY MORE.: NEVER TRUE

## 2023-08-31 ASSESSMENT — ENCOUNTER SYMPTOMS
SINUS PAIN: 0
EYE REDNESS: 0
BACK PAIN: 0
WHEEZING: 1
TROUBLE SWALLOWING: 0
SINUS PRESSURE: 1
CONSTIPATION: 0
COUGH: 1
BLOOD IN STOOL: 0
DIARRHEA: 0
VOMITING: 0
ABDOMINAL PAIN: 0
SORE THROAT: 0
SHORTNESS OF BREATH: 1
PHOTOPHOBIA: 0

## 2023-08-31 ASSESSMENT — LIFESTYLE VARIABLES
HOW OFTEN DO YOU HAVE A DRINK CONTAINING ALCOHOL: 4 OR MORE TIMES A WEEK
HOW MANY STANDARD DRINKS CONTAINING ALCOHOL DO YOU HAVE ON A TYPICAL DAY: 1 OR 2

## 2023-08-31 ASSESSMENT — PATIENT HEALTH QUESTIONNAIRE - PHQ9
1. LITTLE INTEREST OR PLEASURE IN DOING THINGS: 1
SUM OF ALL RESPONSES TO PHQ QUESTIONS 1-9: 1
SUM OF ALL RESPONSES TO PHQ9 QUESTIONS 1 & 2: 1
2. FEELING DOWN, DEPRESSED OR HOPELESS: 0
SUM OF ALL RESPONSES TO PHQ QUESTIONS 1-9: 1

## 2024-03-21 ENCOUNTER — TELEPHONE (OUTPATIENT)
Dept: FAMILY MEDICINE CLINIC | Age: 79
End: 2024-03-21

## 2024-03-21 DIAGNOSIS — J45.998 OTHER ASTHMA: Primary | ICD-10-CM

## 2024-03-21 NOTE — TELEPHONE ENCOUNTER
Wife called to request an appt for Earle to meet with you. He has \"discomfort\" on along his waistline on the left side. He has no other symptoms. She denies change in bowel habits, diarrhea, N&V, fever, change in skin color or blisters.    Advised [facility] for [facility]. Wife declines. States that Earle is a \"problem child\" and only wants to see you. Offered appt 3/26/24 but he is not available, he will be picking his daughter up from the airport. Also offered 4/4 at 8 am but she declined, states that he does not do morning appointments.     Wife will have Earle come through [facility] on Wednesday. I did let De know that there are two providers and he may be assigned to the the other provider.

## 2024-03-21 NOTE — TELEPHONE ENCOUNTER
De called for a refill on Brovana Nebulizing treatments. Rx is ready to be sent to [facility].     Last Appointment:  8/31/2023  No future appointments. ';

## 2024-03-22 RX ORDER — ARFORMOTEROL TARTRATE 15 UG/2ML
15 SOLUTION RESPIRATORY (INHALATION) 2 TIMES DAILY
Qty: 120 ML | Refills: 11 | Status: SHIPPED | OUTPATIENT
Start: 2024-03-22

## 2024-03-22 NOTE — TELEPHONE ENCOUNTER
I will see him on Wednesday. Needs to understand I will be on [facility] and it will be a focused visit. His regular checkup can be scheduled then whenever he wants it.

## 2024-08-16 DIAGNOSIS — J45.40 MODERATE PERSISTENT ASTHMA, UNSPECIFIED WHETHER COMPLICATED: ICD-10-CM

## 2024-08-16 NOTE — TELEPHONE ENCOUNTER
Last Appointment:  8/31/2023  Future Appointments   Date Time Provider Department Center   8/27/2024  3:15 PM Ej Disla MD [facility]

## 2024-08-17 RX ORDER — ALBUTEROL SULFATE 90 UG/1
AEROSOL, METERED RESPIRATORY (INHALATION)
Qty: 8.5 G | Refills: 5 | Status: SHIPPED | OUTPATIENT
Start: 2024-08-17

## 2024-08-27 ENCOUNTER — OFFICE VISIT (OUTPATIENT)
Dept: FAMILY MEDICINE CLINIC | Age: 79
End: 2024-08-27
Payer: MEDICARE

## 2024-08-27 VITALS
HEART RATE: 77 BPM | BODY MASS INDEX: 30.1 KG/M2 | OXYGEN SATURATION: 97 % | WEIGHT: 215 LBS | DIASTOLIC BLOOD PRESSURE: 82 MMHG | TEMPERATURE: 97.1 F | HEIGHT: 71 IN | RESPIRATION RATE: 18 BRPM | SYSTOLIC BLOOD PRESSURE: 138 MMHG

## 2024-08-27 DIAGNOSIS — R93.5 ABNORMAL X-RAY OF ABDOMEN: ICD-10-CM

## 2024-08-27 DIAGNOSIS — I25.10 ARTERIOSCLEROTIC CORONARY ARTERY DISEASE: ICD-10-CM

## 2024-08-27 DIAGNOSIS — E78.49 OTHER HYPERLIPIDEMIA: ICD-10-CM

## 2024-08-27 DIAGNOSIS — J45.998 OTHER ASTHMA: ICD-10-CM

## 2024-08-27 DIAGNOSIS — I10 PRIMARY HYPERTENSION: ICD-10-CM

## 2024-08-27 DIAGNOSIS — Z00.00 INITIAL MEDICARE ANNUAL WELLNESS VISIT: Primary | ICD-10-CM

## 2024-08-27 DIAGNOSIS — R25.2 LEG CRAMPS: ICD-10-CM

## 2024-08-27 DIAGNOSIS — I50.42 CHRONIC COMBINED SYSTOLIC AND DIASTOLIC CONGESTIVE HEART FAILURE (HCC): ICD-10-CM

## 2024-08-27 DIAGNOSIS — J45.41 MODERATE PERSISTENT ASTHMA WITH ACUTE EXACERBATION: ICD-10-CM

## 2024-08-27 DIAGNOSIS — R70.0 ESR RAISED: ICD-10-CM

## 2024-08-27 PROCEDURE — G0438 PPPS, INITIAL VISIT: HCPCS | Performed by: FAMILY MEDICINE

## 2024-08-27 PROCEDURE — 3079F DIAST BP 80-89 MM HG: CPT | Performed by: FAMILY MEDICINE

## 2024-08-27 PROCEDURE — 3075F SYST BP GE 130 - 139MM HG: CPT | Performed by: FAMILY MEDICINE

## 2024-08-27 PROCEDURE — 1123F ACP DISCUSS/DSCN MKR DOCD: CPT | Performed by: FAMILY MEDICINE

## 2024-08-27 RX ORDER — IPRATROPIUM BROMIDE AND ALBUTEROL SULFATE 2.5; .5 MG/3ML; MG/3ML
SOLUTION RESPIRATORY (INHALATION)
Qty: 360 ML | Refills: 3 | Status: SHIPPED | OUTPATIENT
Start: 2024-08-27

## 2024-08-27 RX ORDER — ARFORMOTEROL TARTRATE 15 UG/2ML
15 SOLUTION RESPIRATORY (INHALATION) 2 TIMES DAILY
Qty: 120 ML | Refills: 11 | Status: SHIPPED | OUTPATIENT
Start: 2024-08-27

## 2024-08-27 ASSESSMENT — PATIENT HEALTH QUESTIONNAIRE - PHQ9
2. FEELING DOWN, DEPRESSED OR HOPELESS: NOT AT ALL
SUM OF ALL RESPONSES TO PHQ9 QUESTIONS 1 & 2: 0
SUM OF ALL RESPONSES TO PHQ QUESTIONS 1-9: 0
SUM OF ALL RESPONSES TO PHQ QUESTIONS 1-9: 0
1. LITTLE INTEREST OR PLEASURE IN DOING THINGS: NOT AT ALL
SUM OF ALL RESPONSES TO PHQ QUESTIONS 1-9: 0
SUM OF ALL RESPONSES TO PHQ QUESTIONS 1-9: 0

## 2024-08-27 ASSESSMENT — LIFESTYLE VARIABLES
HOW MANY STANDARD DRINKS CONTAINING ALCOHOL DO YOU HAVE ON A TYPICAL DAY: 1 OR 2
HOW OFTEN DO YOU HAVE A DRINK CONTAINING ALCOHOL: 2-4 TIMES A MONTH

## 2024-08-27 NOTE — PROGRESS NOTES
OFFICE NOTE    24  Name: Bj Talbert  :1945   Sex:male   Age:79 y.o.      SUBJECTIVE  Chief Complaint   Patient presents with    Medicare AWV    Abdominal Pain     Left abdominal pain     Swelling     Bilateral legs and ankles        HPI comes in for both Medicare AWV and checkup and refills. Reports his asthma is fairly stable. 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 Difficult to get history of how involved he is with this.    Review of Systems   Constitutional:  Positive for fatigue. Negative for activity change, appetite change, fever and unexpected weight change.   HENT:  Positive for congestion, hearing loss and sinus pressure. Negative for ear pain, sinus pain, sore throat and trouble swallowing.    Eyes:  Negative for photophobia, redness and visual disturbance.   Respiratory:  Negative for cough, shortness of breath and wheezing.    Cardiovascular:  Negative for chest pain, palpitations and leg swelling.   Gastrointestinal:  Positive for constipation. Negative for abdominal pain, blood in stool, diarrhea and vomiting.   Endocrine: Negative for cold intolerance, polydipsia and polyuria.   Genitourinary:  Positive for frequency and urgency. Negative for difficulty urinating, dysuria, genital sores, hematuria and penile swelling.   Musculoskeletal:  Positive for arthralgias and neck stiffness. Negative for back pain, gait problem and joint swelling.   Skin:  Negative for color change, pallor and rash.   Allergic/Immunologic: Negative for food allergies.   Neurological:  Positive for weakness. Negative for dizziness, tremors, seizures, syncope, numbness and headaches.   Hematological:  Negative for adenopathy. Does not bruise/bleed easily.   Psychiatric/Behavioral:  Positive for dysphoric mood. Negative for agitation, confusion, hallucinations and sleep disturbance. The patient is not nervous/anxious.             Current Outpatient Medications:     arformoterol tartrate (BROVANA) 15 MCG/2ML NEBU, Take 2 mLs by  of your daily activities because of your eyesight?: (!) Yes  Have you had an eye exam within the past year?: (!) No  Interventions:   Patient encouraged to make appointment with their eye specialist                    Objective   Vitals:    08/27/24 1541   BP: 138/82   Pulse: 77   Resp: 18   Temp: 97.1 °F (36.2 °C)   TempSrc: Temporal   SpO2: 97%   Weight: 97.5 kg (215 lb)   Height: 1.803 m (5' 11\")      Body mass index is 29.99 kg/m².        No cognitive decline. Seems angry at times. Not really depressed, resists counselling            Allergies   Allergen Reactions    Hydrochlorothiazide Hives    Advair Hfa [Fluticasone-Salmeterol] Swelling    Amlodipine     Bee Venom     Erythromycin Diarrhea    Iodine     Penicillins      Prior to Visit Medications    Medication Sig Taking? Authorizing Provider   arformoterol tartrate (BROVANA) 15 MCG/2ML NEBU Take 2 mLs by nebulization 2 times daily Yes Ej Disla MD   ipratropium 0.5 mg-albuterol 2.5 mg (DUONEB) 0.5-2.5 (3) MG/3ML SOLN nebulizer solution Use up to qid via minineb machine. May use less frequently when able Yes Ej Disla MD   tiZANidine (ZANAFLEX) 4 MG tablet Take 1 tablet by mouth nightly as needed (low back pain) Yes Ej Disla MD   albuterol sulfate HFA (PROVENTIL;VENTOLIN;PROAIR) 108 (90 Base) MCG/ACT inhaler INHALE TWO PUFFS BY MOUTH INTO THE LUNGS EVERY 6 HOURS AS NEEDED FOR WHEEZING Yes Ej Disla MD   rosuvastatin (CRESTOR) 20 MG tablet Take 1 tablet by mouth daily Yes Dominguez Dan MD   Nebulizers (COMPRESSOR/NEBULIZER) MISC 1 unit with necessary accessories, like tubing and mask. For use in administering duoneb solution as ordered Yes Ej Disla MD   torsemide (DEMADEX) 20 MG tablet Take 1 tablet by mouth daily Take a 1/2 a pill a day Yes Dominguez Dan MD   potassium chloride (KLOR-CON) 20 MEQ packet Take 20 mEq by mouth daily Yes Dominguez Dan MD   Magnesium Chloride (MAG64 PO) Take by mouth Yes yS

## 2024-08-27 NOTE — PATIENT INSTRUCTIONS
performed by an eye specialist is recommended every 1-2 years to screen for glaucoma; cataracts, macular degeneration, and other eye disorders.  A preventive dental visit is recommended every 6 months.  Try to get at least 150 minutes of exercise per week or 10,000 steps per day on a pedometer .  Order or download the FREE \"Exercise & Physical Activity: Your Everyday Guide\" from [employer] [place] [employer]. Call 1-903.492.6474 or search [employer] [place] [employer] online.  You need 3515-9263 mg of calcium and 6719-5457 IU of vitamin D per day. It is possible to meet your calcium requirement with diet alone, but a vitamin D supplement is usually necessary to meet this goal.  When exposed to the sun, use a sunscreen that protects against both UVA and UVB radiation with an SPF of 30 or greater. Reapply every 2 to 3 hours or after sweating, drying off with a towel, or swimming.  Always wear a seat belt when traveling in a car. Always wear a helmet when riding a bicycle or motorcycle.

## 2024-08-28 ASSESSMENT — ENCOUNTER SYMPTOMS
DIARRHEA: 0
SHORTNESS OF BREATH: 0
BACK PAIN: 0
EYE REDNESS: 0
ABDOMINAL PAIN: 0
COUGH: 0
CONSTIPATION: 1
SINUS PRESSURE: 1
COLOR CHANGE: 0
PHOTOPHOBIA: 0
TROUBLE SWALLOWING: 0
WHEEZING: 0
SINUS PAIN: 0
BLOOD IN STOOL: 0
VOMITING: 0
SORE THROAT: 0

## 2024-08-29 DIAGNOSIS — I10 PRIMARY HYPERTENSION: ICD-10-CM

## 2024-08-29 DIAGNOSIS — E78.49 OTHER HYPERLIPIDEMIA: ICD-10-CM

## 2024-08-29 DIAGNOSIS — I25.10 ARTERIOSCLEROTIC CORONARY ARTERY DISEASE: ICD-10-CM

## 2024-08-29 LAB
ALBUMIN: 4.1 G/DL (ref 3.5–5.2)
ALP BLD-CCNC: 54 U/L (ref 40–129)
ALT SERPL-CCNC: 18 U/L (ref 0–40)
ANION GAP SERPL CALCULATED.3IONS-SCNC: 15 MMOL/L (ref 7–16)
AST SERPL-CCNC: 23 U/L (ref 0–39)
BASOPHILS ABSOLUTE: 0.08 K/UL (ref 0–0.2)
BASOPHILS RELATIVE PERCENT: 1 % (ref 0–2)
BILIRUB SERPL-MCNC: 0.7 MG/DL (ref 0–1.2)
BILIRUBIN, URINE: NEGATIVE
BUN BLDV-MCNC: 18 MG/DL (ref 6–23)
CALCIUM SERPL-MCNC: 9.8 MG/DL (ref 8.6–10.2)
CHLORIDE BLD-SCNC: 103 MMOL/L (ref 98–107)
CHOLESTEROL, TOTAL: 141 MG/DL
CO2: 25 MMOL/L (ref 22–29)
COLOR, UA: YELLOW
COMMENT: NORMAL
CREAT SERPL-MCNC: 1.2 MG/DL (ref 0.7–1.2)
EOSINOPHILS ABSOLUTE: 0.15 K/UL (ref 0.05–0.5)
EOSINOPHILS RELATIVE PERCENT: 2 % (ref 0–6)
GFR, ESTIMATED: 63 ML/MIN/1.73M2
GLUCOSE BLD-MCNC: 85 MG/DL (ref 74–99)
GLUCOSE URINE: NEGATIVE MG/DL
HCT VFR BLD CALC: 47.6 % (ref 37–54)
HDLC SERPL-MCNC: 50 MG/DL
HEMOGLOBIN: 14.7 G/DL (ref 12.5–16.5)
IMMATURE GRANULOCYTES %: 1 % (ref 0–5)
IMMATURE GRANULOCYTES ABSOLUTE: 0.06 K/UL (ref 0–0.58)
KETONES, URINE: NEGATIVE MG/DL
LDL CHOLESTEROL: 52 MG/DL
LEUKOCYTE ESTERASE, URINE: NEGATIVE
LYMPHOCYTES ABSOLUTE: 1.55 K/UL (ref 1.5–4)
LYMPHOCYTES RELATIVE PERCENT: 21 % (ref 20–42)
MAGNESIUM: 2.4 MG/DL (ref 1.6–2.6)
MCH RBC QN AUTO: 28.4 PG (ref 26–35)
MCHC RBC AUTO-ENTMCNC: 30.9 G/DL (ref 32–34.5)
MCV RBC AUTO: 91.9 FL (ref 80–99.9)
MONOCYTES ABSOLUTE: 1.12 K/UL (ref 0.1–0.95)
MONOCYTES RELATIVE PERCENT: 15 % (ref 2–12)
NEUTROPHILS ABSOLUTE: 4.34 K/UL (ref 1.8–7.3)
NEUTROPHILS RELATIVE PERCENT: 60 % (ref 43–80)
NITRITE, URINE: NEGATIVE
PDW BLD-RTO: 15.4 % (ref 11.5–15)
PH, URINE: 5.5 (ref 5–9)
PLATELET # BLD: 184 K/UL (ref 130–450)
PMV BLD AUTO: 11.7 FL (ref 7–12)
POTASSIUM SERPL-SCNC: 4.6 MMOL/L (ref 3.5–5)
PROTEIN UA: NEGATIVE MG/DL
RBC # BLD: 5.18 M/UL (ref 3.8–5.8)
SODIUM BLD-SCNC: 143 MMOL/L (ref 132–146)
SPECIFIC GRAVITY UA: 1.02 (ref 1–1.03)
TOTAL PROTEIN: 7.1 G/DL (ref 6.4–8.3)
TRIGL SERPL-MCNC: 193 MG/DL
TSH SERPL DL<=0.05 MIU/L-ACNC: 3.61 UIU/ML (ref 0.27–4.2)
TURBIDITY: CLEAR
URINE HGB: NEGATIVE
UROBILINOGEN, URINE: 0.2 EU/DL (ref 0–1)
VLDLC SERPL CALC-MCNC: 39 MG/DL
WBC # BLD: 7.3 K/UL (ref 4.5–11.5)

## 2024-09-03 ENCOUNTER — TELEPHONE (OUTPATIENT)
Dept: FAMILY MEDICINE CLINIC | Age: 79
End: 2024-09-03

## 2024-09-03 DIAGNOSIS — J45.998 OTHER ASTHMA: ICD-10-CM

## 2024-09-03 RX ORDER — ALBUTEROL SULFATE 90 UG/1
2 AEROSOL, METERED RESPIRATORY (INHALATION) EVERY 6 HOURS PRN
Qty: 1 EACH | Refills: 3 | Status: SHIPPED | OUTPATIENT
Start: 2024-09-03

## 2024-09-03 NOTE — TELEPHONE ENCOUNTER
Patient called into the office in regards to his medication Proair. Patient would like the order printed.     I have the order pending.     Please advise.     I asked patient if he would like the order mailed and he stated yes.     Please advise.

## 2024-10-14 ENCOUNTER — TELEPHONE (OUTPATIENT)
Dept: FAMILY MEDICINE CLINIC | Age: 79
End: 2024-10-14

## 2024-10-14 NOTE — TELEPHONE ENCOUNTER
Wife wanted to make sure office know that Bj needs the pro air HFA brand inhaler, she does not want the RespiClick because it is a powder.

## 2024-10-14 NOTE — TELEPHONE ENCOUNTER
Called to confirm with patient to see if he was using the [facility] or [facility].  Patient stated that he was using the [facility].

## 2025-04-15 ENCOUNTER — OFFICE VISIT (OUTPATIENT)
Dept: FAMILY MEDICINE CLINIC | Age: 80
End: 2025-04-15
Payer: MEDICARE

## 2025-04-15 VITALS
OXYGEN SATURATION: 98 % | SYSTOLIC BLOOD PRESSURE: 138 MMHG | RESPIRATION RATE: 18 BRPM | WEIGHT: 215 LBS | HEART RATE: 88 BPM | BODY MASS INDEX: 30.1 KG/M2 | TEMPERATURE: 97 F | DIASTOLIC BLOOD PRESSURE: 82 MMHG | HEIGHT: 71 IN

## 2025-04-15 DIAGNOSIS — Z12.5 SCREENING FOR PROSTATE CANCER: ICD-10-CM

## 2025-04-15 DIAGNOSIS — M79.10 MYALGIA: ICD-10-CM

## 2025-04-15 DIAGNOSIS — I89.0 LYMPHEDEMA: ICD-10-CM

## 2025-04-15 DIAGNOSIS — R18.8 OTHER ASCITES: ICD-10-CM

## 2025-04-15 DIAGNOSIS — R25.2 MUSCLE CRAMPS: ICD-10-CM

## 2025-04-15 DIAGNOSIS — J45.998 OTHER ASTHMA: ICD-10-CM

## 2025-04-15 DIAGNOSIS — E78.49 OTHER HYPERLIPIDEMIA: ICD-10-CM

## 2025-04-15 DIAGNOSIS — I50.42 CHRONIC COMBINED SYSTOLIC AND DIASTOLIC CONGESTIVE HEART FAILURE (HCC): ICD-10-CM

## 2025-04-15 DIAGNOSIS — I10 PRIMARY HYPERTENSION: Primary | ICD-10-CM

## 2025-04-15 PROCEDURE — 1159F MED LIST DOCD IN RCRD: CPT | Performed by: FAMILY MEDICINE

## 2025-04-15 PROCEDURE — 3075F SYST BP GE 130 - 139MM HG: CPT | Performed by: FAMILY MEDICINE

## 2025-04-15 PROCEDURE — 1123F ACP DISCUSS/DSCN MKR DOCD: CPT | Performed by: FAMILY MEDICINE

## 2025-04-15 PROCEDURE — 3079F DIAST BP 80-89 MM HG: CPT | Performed by: FAMILY MEDICINE

## 2025-04-15 RX ORDER — ALBUTEROL SULFATE 90 UG/1
2 INHALANT RESPIRATORY (INHALATION) EVERY 6 HOURS PRN
Qty: 3 EACH | Refills: 3 | Status: SHIPPED | OUTPATIENT
Start: 2025-04-15

## 2025-04-15 RX ORDER — POTASSIUM CHLORIDE 1500 MG/1
20 TABLET, EXTENDED RELEASE ORAL 2 TIMES DAILY
COMMUNITY
Start: 2025-03-26 | End: 2025-04-17

## 2025-04-15 RX ORDER — SPIRONOLACTONE 25 MG/1
25 TABLET, FILM COATED ORAL DAILY
Qty: 30 TABLET | Refills: 3
Start: 2025-04-15 | End: 2025-04-17 | Stop reason: SDUPTHER

## 2025-04-15 RX ORDER — METOPROLOL SUCCINATE 25 MG/1
25 TABLET, EXTENDED RELEASE ORAL DAILY
COMMUNITY
Start: 2025-03-04

## 2025-04-15 SDOH — ECONOMIC STABILITY: FOOD INSECURITY: WITHIN THE PAST 12 MONTHS, YOU WORRIED THAT YOUR FOOD WOULD RUN OUT BEFORE YOU GOT MONEY TO BUY MORE.: NEVER TRUE

## 2025-04-15 SDOH — ECONOMIC STABILITY: FOOD INSECURITY: WITHIN THE PAST 12 MONTHS, THE FOOD YOU BOUGHT JUST DIDN'T LAST AND YOU DIDN'T HAVE MONEY TO GET MORE.: NEVER TRUE

## 2025-04-15 ASSESSMENT — PATIENT HEALTH QUESTIONNAIRE - PHQ9
SUM OF ALL RESPONSES TO PHQ QUESTIONS 1-9: 0
1. LITTLE INTEREST OR PLEASURE IN DOING THINGS: NOT AT ALL
2. FEELING DOWN, DEPRESSED OR HOPELESS: NOT AT ALL

## 2025-04-15 ASSESSMENT — ENCOUNTER SYMPTOMS
BLOOD IN STOOL: 0
ABDOMINAL PAIN: 0
BACK PAIN: 0
SHORTNESS OF BREATH: 0
PHOTOPHOBIA: 0
TROUBLE SWALLOWING: 0
COLOR CHANGE: 0
EYE REDNESS: 0
SORE THROAT: 0
ABDOMINAL DISTENTION: 1
RHINORRHEA: 1
WHEEZING: 1

## 2025-04-15 NOTE — PROGRESS NOTES
OFFICE NOTE    4/15/25  Name: Bj Talbert  :1945   Sex:male   Age:79 y.o.      SUBJECTIVE  Chief Complaint   Patient presents with    Swelling     Stomach down into the legs     Ear Problem     Left ear ( can't hear out of)     cramping     Legs and hands        History of Present Illness  The patient presents for evaluation of lymphedema, myalgias and muscle cramps, hyperlipidemia, and breathing issues.    He reports intermittent respiratory distress, which he attributes to fluid retention. Severe muscle spasms and cramping, particularly in the legs, have been causing significant discomfort and sleep disturbances. Difficulty in ambulation due to leg swelling and cramping is also reported. The cramping is exacerbated when taking torsemide, which he takes daily except when going out of the house. He experiences muscle spasms while driving and during other activities, which significantly impacts his daily life.    The patient mentions that his leg swelling worsened after a vein was removed from his leg for a heart procedure. He has difficulty wearing boots due to the swelling. He also notes weight gain around the midsection, with an increase in waist size from 38 to 40 inches.    A video consultation with a cardiologist in 2024 led to a recommendation for a neurology consultation and a follow-up appointment this month, but he has not received any communication regarding the appointment. He was taken off candesartan temporarily due to elevated blood pressure but was later put back on it.    The patient underwent bypass surgery on 2019. He uses a nebulizer every night before bed and takes potassium (2 tablets daily) and magnesium (1 tablet daily).    He accompanied someone for a cataract surgery yesterday.    PAST SURGICAL HISTORY:  Bypass surgery on 2019.    SOCIAL HISTORY  The patient drinks 1 beer at dinner time occasionally.       Review of Systems   Constitutional:  Positive for fatigue and

## 2025-04-16 DIAGNOSIS — I10 PRIMARY HYPERTENSION: ICD-10-CM

## 2025-04-16 DIAGNOSIS — E78.49 OTHER HYPERLIPIDEMIA: ICD-10-CM

## 2025-04-16 DIAGNOSIS — I89.0 LYMPHEDEMA: ICD-10-CM

## 2025-04-16 DIAGNOSIS — R25.2 MUSCLE CRAMPS: ICD-10-CM

## 2025-04-16 DIAGNOSIS — I50.42 CHRONIC COMBINED SYSTOLIC AND DIASTOLIC CONGESTIVE HEART FAILURE (HCC): ICD-10-CM

## 2025-04-17 DIAGNOSIS — I89.0 LYMPHEDEMA: ICD-10-CM

## 2025-04-17 DIAGNOSIS — I50.42 CHRONIC COMBINED SYSTOLIC AND DIASTOLIC CONGESTIVE HEART FAILURE (HCC): ICD-10-CM

## 2025-04-17 RX ORDER — SPIRONOLACTONE 25 MG/1
25 TABLET, FILM COATED ORAL DAILY
Qty: 30 TABLET | Refills: 3 | Status: SHIPPED | OUTPATIENT
Start: 2025-04-17

## 2025-04-21 LAB
ALBUMIN: 4.3 G/DL
ALP BLD-CCNC: 53 U/L
ALT SERPL-CCNC: 19 U/L
ANION GAP SERPL CALCULATED.3IONS-SCNC: NORMAL MMOL/L
AST SERPL-CCNC: 24 U/L
BASOPHILS ABSOLUTE: 0.1 /ΜL
BASOPHILS RELATIVE PERCENT: 1 %
BILIRUB SERPL-MCNC: 0.7 MG/DL (ref 0.1–1.4)
BUN BLDV-MCNC: 14 MG/DL
CALCIUM SERPL-MCNC: 9.5 MG/DL
CHLORIDE BLD-SCNC: 107 MMOL/L
CO2: 24 MMOL/L
CREAT SERPL-MCNC: 0.86 MG/DL
EOSINOPHILS ABSOLUTE: 0.2 /ΜL
EOSINOPHILS RELATIVE PERCENT: 3.5 %
GFR, ESTIMATED: 86
GLUCOSE BLD-MCNC: 77 MG/DL
HCT VFR BLD CALC: 44.9 % (ref 41–53)
HEMOGLOBIN: 14.9 G/DL (ref 13.5–17.5)
LYMPHOCYTES ABSOLUTE: 1 /ΜL
LYMPHOCYTES RELATIVE PERCENT: 16.7 %
MAGNESIUM: 1.8 MG/DL
MCH RBC QN AUTO: 29.6 PG
MCHC RBC AUTO-ENTMCNC: 33.2 G/DL
MCV RBC AUTO: 89.1 FL
MONOCYTES ABSOLUTE: 1 /ΜL
MONOCYTES RELATIVE PERCENT: 15.7 %
NEUTROPHILS ABSOLUTE: 3.9 /ΜL
NEUTROPHILS RELATIVE PERCENT: 63.1 %
PDW BLD-RTO: 14.8 %
PLATELET # BLD: 144 K/ΜL
PMV BLD AUTO: 8.8 FL
POTASSIUM SERPL-SCNC: 4.1 MMOL/L
PROSTATE SPECIFIC ANTIGEN: 5.24 NG/ML
RBC # BLD: 5.04 10^6/ΜL
SED RATE, AUTOMATED: 14
SODIUM BLD-SCNC: 140 MMOL/L
TOTAL PROTEIN: 6.8 G/DL (ref 6.4–8.2)
TSH SERPL DL<=0.05 MIU/L-ACNC: 4.37 UIU/ML
WBC # BLD: 6.2 10^3/ML

## 2025-04-28 ENCOUNTER — PATIENT MESSAGE (OUTPATIENT)
Dept: FAMILY MEDICINE CLINIC | Age: 80
End: 2025-04-28

## 2025-04-28 DIAGNOSIS — I50.9 ACUTE ON CHRONIC CONGESTIVE HEART FAILURE, UNSPECIFIED HEART FAILURE TYPE (HCC): Primary | ICD-10-CM

## 2025-04-29 NOTE — TELEPHONE ENCOUNTER
I put in referral to Dr Howell at [facility]. We are trying to get BW from [facility] which was never sent this way. Take 1/2 pill of Demedex with spironolactone. Better weight self every day.

## 2025-04-30 ENCOUNTER — RESULTS FOLLOW-UP (OUTPATIENT)
Dept: FAMILY MEDICINE CLINIC | Age: 80
End: 2025-04-30

## 2025-04-30 DIAGNOSIS — Z12.5 SCREENING FOR PROSTATE CANCER: ICD-10-CM

## 2025-04-30 DIAGNOSIS — R97.20 PSA ELEVATION: Primary | ICD-10-CM

## 2025-04-30 DIAGNOSIS — I89.0 LYMPHEDEMA: ICD-10-CM

## 2025-04-30 DIAGNOSIS — I10 PRIMARY HYPERTENSION: ICD-10-CM

## 2025-04-30 DIAGNOSIS — E78.49 OTHER HYPERLIPIDEMIA: ICD-10-CM

## 2025-04-30 DIAGNOSIS — R25.2 MUSCLE CRAMPS: ICD-10-CM

## 2025-05-08 PROBLEM — I50.42 CHRONIC COMBINED SYSTOLIC AND DIASTOLIC CONGESTIVE HEART FAILURE (HCC): Status: RESOLVED | Noted: 2025-04-15 | Resolved: 2025-05-08

## 2025-05-08 PROBLEM — J44.9 COPD (CHRONIC OBSTRUCTIVE PULMONARY DISEASE) (HCC): Status: ACTIVE | Noted: 2025-05-08

## 2025-05-08 PROBLEM — I50.30 (HFPEF) HEART FAILURE WITH PRESERVED EJECTION FRACTION (HCC): Status: ACTIVE | Noted: 2025-05-08

## 2025-05-12 ENCOUNTER — OFFICE VISIT (OUTPATIENT)
Dept: CARDIOLOGY CLINIC | Age: 80
End: 2025-05-12
Payer: MEDICARE

## 2025-05-12 VITALS
HEART RATE: 74 BPM | HEIGHT: 72 IN | RESPIRATION RATE: 18 BRPM | DIASTOLIC BLOOD PRESSURE: 74 MMHG | BODY MASS INDEX: 29.88 KG/M2 | WEIGHT: 220.6 LBS | SYSTOLIC BLOOD PRESSURE: 158 MMHG

## 2025-05-12 DIAGNOSIS — J44.9 CHRONIC OBSTRUCTIVE PULMONARY DISEASE, UNSPECIFIED COPD TYPE (HCC): ICD-10-CM

## 2025-05-12 DIAGNOSIS — I50.30 HEART FAILURE WITH PRESERVED EJECTION FRACTION, UNSPECIFIED HF CHRONICITY (HCC): ICD-10-CM

## 2025-05-12 DIAGNOSIS — I25.10 ARTERIOSCLEROTIC CORONARY ARTERY DISEASE: Primary | ICD-10-CM

## 2025-05-12 PROCEDURE — G2211 COMPLEX E/M VISIT ADD ON: HCPCS | Performed by: INTERNAL MEDICINE

## 2025-05-12 PROCEDURE — 3077F SYST BP >= 140 MM HG: CPT | Performed by: INTERNAL MEDICINE

## 2025-05-12 PROCEDURE — 93000 ELECTROCARDIOGRAM COMPLETE: CPT | Performed by: INTERNAL MEDICINE

## 2025-05-12 PROCEDURE — 1123F ACP DISCUSS/DSCN MKR DOCD: CPT | Performed by: INTERNAL MEDICINE

## 2025-05-12 PROCEDURE — 99204 OFFICE O/P NEW MOD 45 MIN: CPT | Performed by: INTERNAL MEDICINE

## 2025-05-12 PROCEDURE — 3078F DIAST BP <80 MM HG: CPT | Performed by: INTERNAL MEDICINE

## 2025-05-12 PROCEDURE — 1159F MED LIST DOCD IN RCRD: CPT | Performed by: INTERNAL MEDICINE

## 2025-05-12 PROCEDURE — 1160F RVW MEDS BY RX/DR IN RCRD: CPT | Performed by: INTERNAL MEDICINE

## 2025-05-12 NOTE — PROGRESS NOTES
Chief Complaint   Patient presents with    Coronary Artery Disease    Congestive Heart Failure       Patient Active Problem List    Diagnosis Date Noted    (HFpEF) heart failure with preserved ejection fraction (HCC) 05/08/2025    COPD (chronic obstructive pulmonary disease) (HCC) 05/08/2025    Lymphedema 04/15/2025    Hypertension 07/03/2019    Other asthma 07/03/2019    Hyperlipidemia 07/03/2019    Arteriosclerotic coronary artery disease 07/03/2019     Overview Note:     LIMA T-graft off SVG end to side mid LAD, SVG to OM 1, SVG to PDA) with Dr. Kitchen on 6/18/2019          Current Outpatient Medications   Medication Sig Dispense Refill    metoprolol succinate (TOPROL XL) 25 MG extended release tablet Take 1 tablet by mouth daily      albuterol sulfate HFA (PROAIR HFA) 108 (90 Base) MCG/ACT inhaler Inhale 2 puffs into the lungs every 6 hours as needed for Wheezing 3 each 3    arformoterol tartrate (BROVANA) 15 MCG/2ML NEBU Take 2 mLs by nebulization 2 times daily 120 mL 11    Nebulizers (COMPRESSOR/NEBULIZER) MISC 1 unit with necessary accessories, like tubing and mask. For use in administering duoneb solution as ordered 1 each 0    torsemide (DEMADEX) 20 MG tablet Take 1 tablet by mouth daily Take a 1/2 a pill a day      Magnesium Chloride (MAG64 PO) Take by mouth      aspirin 81 MG tablet Take 2 tablets by mouth daily 180 tablet 1     No current facility-administered medications for this visit.        Allergies   Allergen Reactions    Hydrochlorothiazide Hives    Advair Hfa [Fluticasone-Salmeterol] Swelling    Amlodipine     Azithromycin     Bee Venom     Dexamethasone     Erythromycin Diarrhea    Iodinated Contrast Media     Iodine     Penicillins     Prednisone        Vitals:    05/12/25 1242   BP: (!) 158/74   Pulse: 74   Resp: 18   Weight: 100.1 kg (220 lb 9.6 oz)   Height: 1.829 m (6')                 SUBJECTIVE: Bj Talbert presents to the office today for consult - dr dawood jasmine  I reviewed [facility]

## 2025-05-12 NOTE — PATIENT INSTRUCTIONS
Very very low salt  Elevated legs when sitting  Compression stockings or wrap therapy  Increase magnesikum to 2 pills a day, even three